# Patient Record
Sex: MALE | Race: WHITE | Employment: OTHER | URBAN - METROPOLITAN AREA
[De-identification: names, ages, dates, MRNs, and addresses within clinical notes are randomized per-mention and may not be internally consistent; named-entity substitution may affect disease eponyms.]

---

## 2023-03-09 ENCOUNTER — HOSPITAL ENCOUNTER (INPATIENT)
Age: 73
LOS: 1 days | Discharge: HOME | End: 2023-03-10
Attending: EMERGENCY MEDICINE | Admitting: STUDENT IN AN ORGANIZED HEALTH CARE EDUCATION/TRAINING PROGRAM
Payer: MEDICARE

## 2023-03-09 DIAGNOSIS — N30.00 ACUTE CYSTITIS WITHOUT HEMATURIA: ICD-10-CM

## 2023-03-09 DIAGNOSIS — E11.9 DIABETES MELLITUS, NEW ONSET (HCC): Primary | ICD-10-CM

## 2023-03-09 PROCEDURE — 96375 TX/PRO/DX INJ NEW DRUG ADDON: CPT

## 2023-03-09 PROCEDURE — 96365 THER/PROPH/DIAG IV INF INIT: CPT

## 2023-03-09 PROCEDURE — 99285 EMERGENCY DEPT VISIT HI MDM: CPT

## 2023-03-09 ASSESSMENT — PAIN SCALES - GENERAL: PAINLEVEL_OUTOF10: 5

## 2023-03-09 ASSESSMENT — PAIN DESCRIPTION - DESCRIPTORS: DESCRIPTORS: SHARP

## 2023-03-09 ASSESSMENT — PAIN DESCRIPTION - LOCATION: LOCATION: GROIN

## 2023-03-09 ASSESSMENT — PAIN DESCRIPTION - ORIENTATION: ORIENTATION: RIGHT;LEFT

## 2023-03-09 ASSESSMENT — PAIN - FUNCTIONAL ASSESSMENT: PAIN_FUNCTIONAL_ASSESSMENT: 0-10

## 2023-03-09 ASSESSMENT — PAIN DESCRIPTION - PAIN TYPE: TYPE: ACUTE PAIN

## 2023-03-10 VITALS
DIASTOLIC BLOOD PRESSURE: 71 MMHG | WEIGHT: 255 LBS | SYSTOLIC BLOOD PRESSURE: 128 MMHG | OXYGEN SATURATION: 97 % | HEART RATE: 75 BPM | HEIGHT: 72 IN | RESPIRATION RATE: 16 BRPM | BODY MASS INDEX: 34.54 KG/M2 | TEMPERATURE: 98 F

## 2023-03-10 PROBLEM — E66.811 CLASS 1 OBESITY DUE TO EXCESS CALORIES WITHOUT SERIOUS COMORBIDITY WITH BODY MASS INDEX (BMI) OF 34.0 TO 34.9 IN ADULT: Status: ACTIVE | Noted: 2023-03-10

## 2023-03-10 PROBLEM — E11.9 NEWLY DIAGNOSED DIABETES (HCC): Status: ACTIVE | Noted: 2023-03-10

## 2023-03-10 PROBLEM — E66.09 CLASS 1 OBESITY DUE TO EXCESS CALORIES WITHOUT SERIOUS COMORBIDITY WITH BODY MASS INDEX (BMI) OF 34.0 TO 34.9 IN ADULT: Status: ACTIVE | Noted: 2023-03-10

## 2023-03-10 LAB
ABSOLUTE EOS #: <0.03 K/UL (ref 0–0.44)
ABSOLUTE IMMATURE GRANULOCYTE: 0.04 K/UL (ref 0–0.3)
ABSOLUTE LYMPH #: 1.03 K/UL (ref 1.1–3.7)
ABSOLUTE MONO #: 0.85 K/UL (ref 0.1–1.2)
ALBUMIN SERPL-MCNC: 4.8 G/DL (ref 3.5–5.2)
ALBUMIN/GLOBULIN RATIO: 1.9 (ref 1–2.5)
ALP SERPL-CCNC: 129 U/L (ref 40–129)
ALT SERPL-CCNC: 14 U/L (ref 5–41)
ANION GAP SERPL CALCULATED.3IONS-SCNC: 15 MMOL/L (ref 9–17)
ANION GAP SERPL CALCULATED.3IONS-SCNC: 17 MMOL/L (ref 9–17)
AST SERPL-CCNC: 18 U/L
BACTERIA: ABNORMAL
BASOPHILS # BLD: 0 % (ref 0–2)
BASOPHILS ABSOLUTE: 0.05 K/UL (ref 0–0.2)
BETA-HYDROXYBUTYRATE: 2.3 MMOL/L (ref 0.02–0.27)
BILIRUB SERPL-MCNC: 1.4 MG/DL (ref 0.3–1.2)
BILIRUBIN URINE: NEGATIVE
BUN SERPL-MCNC: 28 MG/DL (ref 8–23)
BUN SERPL-MCNC: 29 MG/DL (ref 8–23)
CALCIUM SERPL-MCNC: 9.4 MG/DL (ref 8.6–10.4)
CALCIUM SERPL-MCNC: 9.8 MG/DL (ref 8.6–10.4)
CASTS UA: ABNORMAL /LPF (ref 0–8)
CHLORIDE SERPL-SCNC: 102 MMOL/L (ref 98–107)
CHLORIDE SERPL-SCNC: 99 MMOL/L (ref 98–107)
CO2 SERPL-SCNC: 18 MMOL/L (ref 20–31)
CO2 SERPL-SCNC: 19 MMOL/L (ref 20–31)
COLOR: YELLOW
CREAT SERPL-MCNC: 0.99 MG/DL (ref 0.7–1.2)
CREAT SERPL-MCNC: 1.11 MG/DL (ref 0.7–1.2)
EOSINOPHILS RELATIVE PERCENT: 0 % (ref 1–4)
EPITHELIAL CELLS UA: ABNORMAL /HPF (ref 0–5)
GFR SERPL CREATININE-BSD FRML MDRD: >60 ML/MIN/1.73M2
GFR SERPL CREATININE-BSD FRML MDRD: >60 ML/MIN/1.73M2
GLUCOSE BLD-MCNC: 178 MG/DL (ref 75–110)
GLUCOSE BLD-MCNC: 342 MG/DL (ref 75–110)
GLUCOSE BLD-MCNC: 411 MG/DL (ref 75–110)
GLUCOSE BLD-MCNC: 412 MG/DL (ref 75–110)
GLUCOSE SERPL-MCNC: 375 MG/DL (ref 70–99)
GLUCOSE SERPL-MCNC: 423 MG/DL (ref 70–99)
GLUCOSE UR STRIP.AUTO-MCNC: ABNORMAL MG/DL
HCT VFR BLD AUTO: 44.8 % (ref 40.7–50.3)
HGB BLD-MCNC: 16.4 G/DL (ref 13–17)
IMMATURE GRANULOCYTES: 0 %
KETONES UR STRIP.AUTO-MCNC: ABNORMAL MG/DL
LEUKOCYTE ESTERASE UR QL STRIP.AUTO: ABNORMAL
LYMPHOCYTES # BLD: 8 % (ref 24–43)
MCH RBC QN AUTO: 31.1 PG (ref 25.2–33.5)
MCHC RBC AUTO-ENTMCNC: 36.6 G/DL (ref 28.4–34.8)
MCV RBC AUTO: 85 FL (ref 82.6–102.9)
MICROORGANISM SPEC CULT: NORMAL
MONOCYTES # BLD: 6 % (ref 3–12)
NITRITE UR QL STRIP.AUTO: NEGATIVE
NRBC AUTOMATED: 0 PER 100 WBC
PDW BLD-RTO: 12.9 % (ref 11.8–14.4)
PLATELET # BLD AUTO: 202 K/UL (ref 138–453)
PMV BLD AUTO: 9.6 FL (ref 8.1–13.5)
POTASSIUM SERPL-SCNC: 3.9 MMOL/L (ref 3.7–5.3)
POTASSIUM SERPL-SCNC: 4 MMOL/L (ref 3.7–5.3)
PROT SERPL-MCNC: 7.3 G/DL (ref 6.4–8.3)
PROT UR STRIP.AUTO-MCNC: 8.5 MG/DL (ref 5–8)
PROT UR STRIP.AUTO-MCNC: ABNORMAL MG/DL
RBC # BLD: 5.27 M/UL (ref 4.21–5.77)
RBC CLUMPS #/AREA URNS AUTO: ABNORMAL /HPF (ref 0–4)
SEG NEUTROPHILS: 86 % (ref 36–65)
SEGMENTED NEUTROPHILS ABSOLUTE COUNT: 11.45 K/UL (ref 1.5–8.1)
SODIUM SERPL-SCNC: 135 MMOL/L (ref 135–144)
SODIUM SERPL-SCNC: 135 MMOL/L (ref 135–144)
SPECIFIC GRAVITY UA: 1.03 (ref 1–1.03)
SPECIMEN DESCRIPTION: NORMAL
TURBIDITY: ABNORMAL
URINE HGB: ABNORMAL
UROBILINOGEN, URINE: NORMAL
WBC # BLD AUTO: 13.4 K/UL (ref 3.5–11.3)
WBC UA: ABNORMAL /HPF (ref 0–5)

## 2023-03-10 PROCEDURE — 2580000003 HC RX 258: Performed by: NURSE PRACTITIONER

## 2023-03-10 PROCEDURE — 80048 BASIC METABOLIC PNL TOTAL CA: CPT

## 2023-03-10 PROCEDURE — 80053 COMPREHEN METABOLIC PANEL: CPT

## 2023-03-10 PROCEDURE — 6370000000 HC RX 637 (ALT 250 FOR IP): Performed by: NURSE PRACTITIONER

## 2023-03-10 PROCEDURE — 87086 URINE CULTURE/COLONY COUNT: CPT

## 2023-03-10 PROCEDURE — 2580000003 HC RX 258

## 2023-03-10 PROCEDURE — 83036 HEMOGLOBIN GLYCOSYLATED A1C: CPT

## 2023-03-10 PROCEDURE — 1200000000 HC SEMI PRIVATE

## 2023-03-10 PROCEDURE — 6370000000 HC RX 637 (ALT 250 FOR IP): Performed by: STUDENT IN AN ORGANIZED HEALTH CARE EDUCATION/TRAINING PROGRAM

## 2023-03-10 PROCEDURE — 6360000002 HC RX W HCPCS: Performed by: NURSE PRACTITIONER

## 2023-03-10 PROCEDURE — 81001 URINALYSIS AUTO W/SCOPE: CPT

## 2023-03-10 PROCEDURE — 6360000002 HC RX W HCPCS

## 2023-03-10 PROCEDURE — 82947 ASSAY GLUCOSE BLOOD QUANT: CPT

## 2023-03-10 PROCEDURE — 36415 COLL VENOUS BLD VENIPUNCTURE: CPT

## 2023-03-10 PROCEDURE — 99222 1ST HOSP IP/OBS MODERATE 55: CPT | Performed by: STUDENT IN AN ORGANIZED HEALTH CARE EDUCATION/TRAINING PROGRAM

## 2023-03-10 PROCEDURE — 82010 KETONE BODYS QUAN: CPT

## 2023-03-10 PROCEDURE — 85025 COMPLETE CBC W/AUTO DIFF WBC: CPT

## 2023-03-10 RX ORDER — BLOOD-GLUCOSE METER
1 KIT MISCELLANEOUS DAILY
Qty: 1 KIT | Refills: 0 | Status: SHIPPED | OUTPATIENT
Start: 2023-03-10

## 2023-03-10 RX ORDER — INSULIN GLARGINE 100 [IU]/ML
15 INJECTION, SOLUTION SUBCUTANEOUS ONCE
Status: COMPLETED | OUTPATIENT
Start: 2023-03-10 | End: 2023-03-10

## 2023-03-10 RX ORDER — LANCETS 30 GAUGE
1 EACH MISCELLANEOUS DAILY
Qty: 100 EACH | Refills: 3 | Status: SHIPPED | OUTPATIENT
Start: 2023-03-10

## 2023-03-10 RX ORDER — ACETAMINOPHEN 650 MG/1
650 SUPPOSITORY RECTAL EVERY 6 HOURS PRN
Status: DISCONTINUED | OUTPATIENT
Start: 2023-03-10 | End: 2023-03-10 | Stop reason: HOSPADM

## 2023-03-10 RX ORDER — ACETAMINOPHEN 325 MG/1
650 TABLET ORAL EVERY 6 HOURS PRN
Status: DISCONTINUED | OUTPATIENT
Start: 2023-03-10 | End: 2023-03-10 | Stop reason: HOSPADM

## 2023-03-10 RX ORDER — GLIMEPIRIDE 2 MG/1
2 TABLET ORAL
Qty: 30 TABLET | Refills: 0 | Status: SHIPPED | OUTPATIENT
Start: 2023-03-10 | End: 2023-04-09

## 2023-03-10 RX ORDER — GLUCOSAMINE HCL/CHONDROITIN SU 500-400 MG
CAPSULE ORAL
Qty: 100 STRIP | Refills: 0 | Status: SHIPPED | OUTPATIENT
Start: 2023-03-10

## 2023-03-10 RX ORDER — POLYETHYLENE GLYCOL 3350 17 G/17G
17 POWDER, FOR SOLUTION ORAL DAILY PRN
Status: DISCONTINUED | OUTPATIENT
Start: 2023-03-10 | End: 2023-03-10 | Stop reason: HOSPADM

## 2023-03-10 RX ORDER — ENOXAPARIN SODIUM 100 MG/ML
30 INJECTION SUBCUTANEOUS 2 TIMES DAILY
Status: DISCONTINUED | OUTPATIENT
Start: 2023-03-10 | End: 2023-03-10 | Stop reason: HOSPADM

## 2023-03-10 RX ORDER — DEXTROSE MONOHYDRATE 100 MG/ML
INJECTION, SOLUTION INTRAVENOUS CONTINUOUS PRN
Status: DISCONTINUED | OUTPATIENT
Start: 2023-03-10 | End: 2023-03-10 | Stop reason: HOSPADM

## 2023-03-10 RX ORDER — KETOROLAC TROMETHAMINE 15 MG/ML
15 INJECTION, SOLUTION INTRAMUSCULAR; INTRAVENOUS ONCE
Status: COMPLETED | OUTPATIENT
Start: 2023-03-10 | End: 2023-03-10

## 2023-03-10 RX ORDER — CEFTRIAXONE 1 G/1
1000 INJECTION, POWDER, FOR SOLUTION INTRAMUSCULAR; INTRAVENOUS ONCE
Status: DISCONTINUED | OUTPATIENT
Start: 2023-03-10 | End: 2023-03-10

## 2023-03-10 RX ORDER — 0.9 % SODIUM CHLORIDE 0.9 %
1000 INTRAVENOUS SOLUTION INTRAVENOUS ONCE
Status: COMPLETED | OUTPATIENT
Start: 2023-03-10 | End: 2023-03-10

## 2023-03-10 RX ORDER — SODIUM CHLORIDE 0.9 % (FLUSH) 0.9 %
5-40 SYRINGE (ML) INJECTION PRN
Status: DISCONTINUED | OUTPATIENT
Start: 2023-03-10 | End: 2023-03-10 | Stop reason: HOSPADM

## 2023-03-10 RX ORDER — INSULIN LISPRO 100 [IU]/ML
0-16 INJECTION, SOLUTION INTRAVENOUS; SUBCUTANEOUS
Status: DISCONTINUED | OUTPATIENT
Start: 2023-03-10 | End: 2023-03-10 | Stop reason: HOSPADM

## 2023-03-10 RX ORDER — INSULIN LISPRO 100 [IU]/ML
0-4 INJECTION, SOLUTION INTRAVENOUS; SUBCUTANEOUS NIGHTLY
Status: DISCONTINUED | OUTPATIENT
Start: 2023-03-10 | End: 2023-03-10

## 2023-03-10 RX ORDER — INSULIN LISPRO 100 [IU]/ML
0-4 INJECTION, SOLUTION INTRAVENOUS; SUBCUTANEOUS
Status: DISCONTINUED | OUTPATIENT
Start: 2023-03-10 | End: 2023-03-10

## 2023-03-10 RX ORDER — INSULIN LISPRO 100 [IU]/ML
0-4 INJECTION, SOLUTION INTRAVENOUS; SUBCUTANEOUS NIGHTLY
Status: DISCONTINUED | OUTPATIENT
Start: 2023-03-10 | End: 2023-03-10 | Stop reason: HOSPADM

## 2023-03-10 RX ORDER — SODIUM CHLORIDE 0.9 % (FLUSH) 0.9 %
5-40 SYRINGE (ML) INJECTION EVERY 12 HOURS SCHEDULED
Status: DISCONTINUED | OUTPATIENT
Start: 2023-03-10 | End: 2023-03-10 | Stop reason: HOSPADM

## 2023-03-10 RX ORDER — SODIUM CHLORIDE 9 MG/ML
INJECTION, SOLUTION INTRAVENOUS PRN
Status: DISCONTINUED | OUTPATIENT
Start: 2023-03-10 | End: 2023-03-10 | Stop reason: HOSPADM

## 2023-03-10 RX ORDER — INSULIN LISPRO 100 [IU]/ML
5 INJECTION, SOLUTION INTRAVENOUS; SUBCUTANEOUS ONCE
Status: COMPLETED | OUTPATIENT
Start: 2023-03-10 | End: 2023-03-10

## 2023-03-10 RX ORDER — CEFDINIR 300 MG/1
300 CAPSULE ORAL 2 TIMES DAILY
Qty: 14 CAPSULE | Refills: 0 | Status: SHIPPED | OUTPATIENT
Start: 2023-03-10 | End: 2023-03-17

## 2023-03-10 RX ORDER — ONDANSETRON 2 MG/ML
4 INJECTION INTRAMUSCULAR; INTRAVENOUS EVERY 6 HOURS PRN
Status: DISCONTINUED | OUTPATIENT
Start: 2023-03-10 | End: 2023-03-10 | Stop reason: HOSPADM

## 2023-03-10 RX ORDER — SODIUM CHLORIDE 9 MG/ML
INJECTION, SOLUTION INTRAVENOUS CONTINUOUS
Status: DISCONTINUED | OUTPATIENT
Start: 2023-03-10 | End: 2023-03-10

## 2023-03-10 RX ORDER — ONDANSETRON 4 MG/1
4 TABLET, ORALLY DISINTEGRATING ORAL EVERY 8 HOURS PRN
Status: DISCONTINUED | OUTPATIENT
Start: 2023-03-10 | End: 2023-03-10 | Stop reason: HOSPADM

## 2023-03-10 RX ADMIN — INSULIN LISPRO 4 UNITS: 100 INJECTION, SOLUTION INTRAVENOUS; SUBCUTANEOUS at 08:46

## 2023-03-10 RX ADMIN — INSULIN LISPRO 5 UNITS: 100 INJECTION, SOLUTION INTRAVENOUS; SUBCUTANEOUS at 04:16

## 2023-03-10 RX ADMIN — INSULIN LISPRO 12 UNITS: 100 INJECTION, SOLUTION INTRAVENOUS; SUBCUTANEOUS at 11:26

## 2023-03-10 RX ADMIN — SODIUM CHLORIDE, PRESERVATIVE FREE 10 ML: 5 INJECTION INTRAVENOUS at 11:29

## 2023-03-10 RX ADMIN — SODIUM CHLORIDE 1000 ML: 9 INJECTION, SOLUTION INTRAVENOUS at 00:13

## 2023-03-10 RX ADMIN — ENOXAPARIN SODIUM 30 MG: 100 INJECTION SUBCUTANEOUS at 04:48

## 2023-03-10 RX ADMIN — SODIUM CHLORIDE: 9 INJECTION, SOLUTION INTRAVENOUS at 03:39

## 2023-03-10 RX ADMIN — CEFTRIAXONE SODIUM 1000 MG: 1 INJECTION, POWDER, FOR SOLUTION INTRAMUSCULAR; INTRAVENOUS at 01:23

## 2023-03-10 RX ADMIN — KETOROLAC TROMETHAMINE 15 MG: 15 INJECTION, SOLUTION INTRAMUSCULAR; INTRAVENOUS at 00:12

## 2023-03-10 RX ADMIN — INSULIN GLARGINE 15 UNITS: 100 INJECTION, SOLUTION SUBCUTANEOUS at 11:26

## 2023-03-10 ASSESSMENT — PAIN SCALES - GENERAL
PAINLEVEL_OUTOF10: 3
PAINLEVEL_OUTOF10: 5

## 2023-03-10 ASSESSMENT — PAIN DESCRIPTION - ORIENTATION
ORIENTATION: RIGHT;LEFT
ORIENTATION: RIGHT;LEFT

## 2023-03-10 ASSESSMENT — ENCOUNTER SYMPTOMS
SORE THROAT: 0
DIARRHEA: 0
ABDOMINAL DISTENTION: 0
NAUSEA: 0
VOMITING: 0
ABDOMINAL PAIN: 1
COLOR CHANGE: 0
ABDOMINAL PAIN: 0
EYE REDNESS: 0
SHORTNESS OF BREATH: 0
STRIDOR: 0
APNEA: 0
COUGH: 0
EYE ITCHING: 0

## 2023-03-10 ASSESSMENT — PAIN - FUNCTIONAL ASSESSMENT: PAIN_FUNCTIONAL_ASSESSMENT: 0-10

## 2023-03-10 ASSESSMENT — PAIN DESCRIPTION - LOCATION
LOCATION: GROIN
LOCATION: GROIN

## 2023-03-10 ASSESSMENT — PAIN DESCRIPTION - DESCRIPTORS: DESCRIPTORS: SHARP

## 2023-03-10 NOTE — PROGRESS NOTES
CLINICAL PHARMACY NOTE: MEDS TO BEDS    Total # of Prescriptions Filled: 6   The following medications were delivered to the patient:  Glucometer  Test strips  Lancets  Metformin 1000mg  Glimepiride 2mg  Cefdinir 300mg    Additional Documentation: delivered to patient in room 328 3/10 at 11:13am. Co-pay $44.08 [employer].

## 2023-03-10 NOTE — H&P
[facility]  Office: [address], DO, Colette Guardado, DO, Jerry Taylor, DO, Faina Betancourt Blood, DO, Rob Christianson MD, Rachana Gay MD, Jean Weldon MD, Kobe Márquez MD,  Azul Pablo MD, Criss Huff MD, Joyce Faria, DO, Kyle Duffy MD,  Sadaf Pacheco MD, Fernando Stewart MD, Coye Meigs, DO, Tucker Acosta MD, Aurelio Worthington MD, Karishma Castaneda DO, Clearance MD Leatha, Delfin Dey MD, Yanet Zhu MD, Byron Castrejon MD, Patty Alaniz MD, James Daniels DO, Maycol Castaneda MD, Vinh Pérez MD, Shahla Riddle, CNP,  Fariha Avalos, CNP, Laura Rojo, CNP, Hiram Franks, CNP,  Tamia Bangura, DNP, Arabella Ryan, CNP, Merle Louise, CNP, Devin Rutherford, CNP, Reynold Becker, CNP, Keshav Stallworth, CNP, Jossy Valdes PA-C, Shagufta Phillips, CNS, Sherley Loomis, CNP, Ladan Burdick, CNP         [address]    HISTORY AND PHYSICAL EXAMINATION            Date:   3/10/2023  Patient name:  Anna Contreras  Date of admission:  3/9/2023 11:36 PM  MRN:   [id number]  Account:  [de-identified]  YOB: 1950  PCP:    No primary care provider on file. Room:   [address] [place] [address]  Code Status:    Full Code    Chief Complaint:     Chief Complaint   Patient presents with    Groin Pain       History Obtained From:     patient, spouse    History of Present Illness:     Anna Contreras is a 71 y.o. Non- / non  male who presents with Groin Pain   and is admitted to the hospital for the management of Newly diagnosed diabetes ([facility] [address] 71year old non compliant male with obesity presents with urinary pain. Has not seen a PCP in many years found to be newly diabetic. Has had history of kidney stones in the past 40 years. Past Medical History:     History reviewed. No pertinent past medical history. Past Surgical History:     History reviewed. No pertinent surgical history.      Medications Prior to Admission:     Prior to Admission medications    Not on File        Allergies:     Patient has no known allergies. Social History:     Tobacco:    has no history on file for tobacco use. Alcohol:      reports current alcohol use. Drug Use:  has no history on file for drug use. Family History:     History reviewed. No pertinent family history. Review of Systems:     Positive and Negative as described in HPI. Review of Systems   Constitutional:  Negative for activity change, fatigue and unexpected weight change. HENT:  Negative for congestion and mouth sores. Eyes:  Negative for redness and itching. Respiratory:  Negative for apnea and shortness of breath. Cardiovascular:  Negative for chest pain and palpitations. Gastrointestinal:  Negative for abdominal distention and abdominal pain. Endocrine: Positive for polydipsia and polyuria. Genitourinary:  Positive for dysuria. Negative for difficulty urinating. Musculoskeletal:  Negative for arthralgias and myalgias. Skin:  Negative for color change and wound. Neurological:  Negative for dizziness and seizures. Psychiatric/Behavioral:  Negative for agitation and dysphoric mood. Physical Exam:   BP (!) 163/70   Pulse 88   Temp 98.4 °F (36.9 °C) (Oral)   Resp 17   Ht 6' (1.829 m)   Wt 255 lb (115.7 kg)   SpO2 98%   BMI 34.58 kg/m²   Temp (24hrs), Av.6 °F (37 °C), Min:98.4 °F (36.9 °C), Max:98.8 °F (37.1 °C)    Recent Labs     03/10/23  0351 03/10/23  0749   POCGLU 412* 411*     No intake or output data in the 24 hours ending 03/10/23 0911    Physical Exam  Constitutional:       Appearance: He is obese. He is not ill-appearing or diaphoretic. HENT:      Head: Normocephalic. Right Ear: External ear normal.      Left Ear: External ear normal.      Nose: Nose normal.      Mouth/Throat:      Mouth: Mucous membranes are moist.   Eyes:      Pupils: Pupils are equal, round, and reactive to light.    Cardiovascular:      Rate and Rhythm: Normal rate and regular rhythm. Heart sounds: No murmur heard. Pulmonary:      Effort: No respiratory distress. Breath sounds: No rhonchi. Comments: Decreased bialterally in bases  Abdominal:      General: There is no distension. Palpations: Abdomen is soft. Tenderness: There is no guarding. Musculoskeletal:      Cervical back: Neck supple. Right lower leg: No edema. Left lower leg: No edema. Skin:     General: Skin is dry. Capillary Refill: Capillary refill takes less than 2 seconds. Coloration: Skin is pale. Neurological:      Mental Status: He is alert and oriented to person, place, and time.    Psychiatric:         Mood and Affect: Mood normal.         Behavior: Behavior normal.       Investigations:      Laboratory Testing:  Recent Results (from the past 24 hour(s))   CBC with Auto Differential    Collection Time: 03/10/23 12:08 AM   Result Value Ref Range    WBC 13.4 (H) 3.5 - 11.3 k/uL    RBC 5.27 4.21 - 5.77 m/uL    Hemoglobin 16.4 13.0 - 17.0 g/dL    Hematocrit 44.8 40.7 - 50.3 %    MCV 85.0 82.6 - 102.9 fL    MCH 31.1 25.2 - 33.5 pg    MCHC 36.6 (H) 28.4 - 34.8 g/dL    RDW 12.9 11.8 - 14.4 %    Platelets 692 565 - 348 k/uL    MPV 9.6 8.1 - 13.5 fL    NRBC Automated 0.0 0.0 per 100 WBC    Seg Neutrophils 86 (H) 36 - 65 %    Lymphocytes 8 (L) 24 - 43 %    Monocytes 6 3 - 12 %    Eosinophils % 0 (L) 1 - 4 %    Basophils 0 0 - 2 %    Immature Granulocytes 0 0 %    Segs Absolute 11.45 (H) 1.50 - 8.10 k/uL    Absolute Lymph # 1.03 (L) 1.10 - 3.70 k/uL    Absolute Mono # 0.85 0.10 - 1.20 k/uL    Absolute Eos # <0.03 0.00 - 0.44 k/uL    Basophils Absolute 0.05 0.00 - 0.20 k/uL    Absolute Immature Granulocyte 0.04 0.00 - 0.30 k/uL   Comprehensive Metabolic Panel    Collection Time: 03/10/23 12:09 AM   Result Value Ref Range    Glucose 375 (H) 70 - 99 mg/dL    BUN 28 (H) 8 - 23 mg/dL    Creatinine 1.11 0.70 - 1.20 mg/dL    Est, Glom Filt Rate >60 >60 mL/min/1.73m2    Calcium 9.8 8.6 - 10.4 mg/dL    Sodium 135 135 - 144 mmol/L    Potassium 3.9 3.7 - 5.3 mmol/L    Chloride 99 98 - 107 mmol/L    CO2 19 (L) 20 - 31 mmol/L    Anion Gap 17 9 - 17 mmol/L    Alkaline Phosphatase 129 40 - 129 U/L    ALT 14 5 - 41 U/L    AST 18 <40 U/L    Total Bilirubin 1.4 (H) 0.3 - 1.2 mg/dL    Total Protein 7.3 6.4 - 8.3 g/dL    Albumin 4.8 3.5 - 5.2 g/dL    Albumin/Globulin Ratio 1.9 1.0 - 2.5   Beta-Hydroxybutyrate    Collection Time: 03/10/23 12:09 AM   Result Value Ref Range    Beta-Hydroxybutyrate 2.30 (H) 0.02 - 0.27 mmol/L   Urinalysis with Microscopic    Collection Time: 03/10/23 12:15 AM   Result Value Ref Range    Color, UA Yellow Yellow    Turbidity UA Turbid (A) Clear    Glucose, Ur 3+ (A) NEGATIVE    Bilirubin Urine NEGATIVE NEGATIVE    Ketones, Urine SMALL (A) NEGATIVE    Specific Gravity, UA 1.030 1.005 - 1.030    Urine Hgb SMALL (A) NEGATIVE    pH, UA 8.5 (H) 5.0 - 8.0    Protein, UA 1+ (A) NEGATIVE    Urobilinogen, Urine Normal Normal    Nitrite, Urine NEGATIVE NEGATIVE    Leukocyte Esterase, Urine MODERATE (A) NEGATIVE    WBC, UA TOO NUMEROUS TO COUNT 0 - 5 /HPF    RBC, UA 0 TO 2 0 - 4 /HPF    Casts UA  0 - 8 /LPF     2 TO 5 HYALINE Reference range defined for non-centrifuged specimen.     Epithelial Cells UA 0 TO 2 0 - 5 /HPF    Bacteria, UA MANY (A) None   POC Glucose Fingerstick    Collection Time: 03/10/23  3:51 AM   Result Value Ref Range    POC Glucose 412 (HH) 75 - 110 mg/dL   Basic Metabolic Panel w/ Reflex to MG    Collection Time: 03/10/23  7:18 AM   Result Value Ref Range    Glucose 423 (HH) 70 - 99 mg/dL    BUN 29 (H) 8 - 23 mg/dL    Creatinine 0.99 0.70 - 1.20 mg/dL    Est, Glom Filt Rate >60 >60 mL/min/1.73m2    Calcium 9.4 8.6 - 10.4 mg/dL    Sodium 135 135 - 144 mmol/L    Potassium 4.0 3.7 - 5.3 mmol/L    Chloride 102 98 - 107 mmol/L    CO2 18 (L) 20 - 31 mmol/L    Anion Gap 15 9 - 17 mmol/L   POC Glucose Fingerstick    Collection Time: 03/10/23  7:49 AM   Result Value Ref Range    POC Glucose 411 (HH) 75 - 110 mg/dL       Imaging/Diagnostics:  No results found. Assessment :      Hospital Problems             Last Modified POA    * (Principal) Newly diagnosed diabetes ([address] 3/10/2023 Yes       Plan:     Patient status observation in the Med/Surge    Discussed with patient and wife. No interested in staying for a prolonged duration. [facility] with oral hypoglycemics, not interested in ozempic, ok with amaryl and metformin. UTI cefdinir  Encouraged to have PCP follow up. Not interested at this time  Wanting to go home today, not willing to stay for further work up.     Consultations:   IP CONSULT TO HOSPITALIST  IP CONSULT TO SOCIAL WORK      Tiffany Marshall MD  3/10/2023  9:11 AM

## 2023-03-10 NOTE — ED NOTES
Pt presents to ER with c/o of groin pain without traumatic injury. Pt states he had the same symptom back in 40 years ago and was diagnosed with kidney stones. Pt denies dysuria nor hematuria. Pt is Ox4&A, NAD noted. Pt denies chest pain, CVAT, SOB, N/V/D. Continue with plan of care.      THIAGO Pascual  03/09/23 1980

## 2023-03-10 NOTE — ED PROVIDER NOTES
Faculty Sign-Out Attestation  Handoff taken on the following patient from prior Attending Physician: Rosalva Nieves    I was available and discussed any additional care issues that arose and coordinated the management plans with the resident(s) caring for the patient during my duty period. Any areas of disagreement with residents documentation of care or procedures are noted on the chart. I was personally present for the key portions of any/all procedures during my duty period. I have documented in the chart those procedures where I was not present during the key portions.     New onset dm, uti,   Inter med admit     Dacia Parkinson DO  Attending Physician       Dacia Parkinson DO  03/10/23 0133

## 2023-03-10 NOTE — ED PROVIDER NOTES
[facility]     Emergency Department     Faculty Attestation    I performed a history and physical examination of the patient and discussed management with the resident. I reviewed the residents note and agree with the documented findings and plan of care. Any areas of disagreement are noted on the chart. I was personally present for the key portions of any procedures. I have documented in the chart those procedures where I was not present during the key portions. I have reviewed the emergency nurses triage note. I agree with the chief complaint, past medical history, past surgical history, allergies, medications, social and family history as documented unless otherwise noted below. For Physician Assistant/ Nurse Practitioner cases/documentation I have personally evaluated this patient and have completed at least one if not all key elements of the E/M (history, physical exam, and MDM). Additional findings are as noted. I have personally seen and evaluated the patient. I find the patient's history and physical exam are consistent with the NP/PA documentation. I agree with the care provided, treatment rendered, disposition and follow-up plan. 69-year-old male with history of kidney stones presenting with pelvic pain. States that he has had intermittent kidney stones for the last 40 years, but typically pain does not last more than 2 hours before he passes it. He has had pain since 7:00 tonight, and is not improving. No back pain, chest pain, or shortness of breath. No syncope. Denies any blood in the urine. When straining, urine stream is difficult to pass. Denies testicular pain. Pain is constant, not colicky.     Exam:  General : Laying on the bed, awake, alert, and in no acute distress  CV : normal rate and regular rhythm  Lungs : Breathing comfortably on room air with no tachypnea, hypoxia, or increased work of breathing  Abdomen: Suprapubic abdominal pain    DDx: Ureterolithiasis, UTI/pyelonephritis. No flank pain or syncopal events to suggest AAA. No history of malignancy. Doubt torsion or testicular mass with no scrotal pain. No fever or chills to suggest systemic infection. Plan:  UA, CBC, electrolytes with renal function  If UA is positive, plan to treat for infection. If UA is negative for infection will obtain CT imaging to evaluate for obstructive ureterolithiasis or other sources of suprapubic abdominal pain on CT such as mass, vascular lesion, etc.  Pain control, IV fluids    Medical Decision Making  Amount and/or Complexity of Data Reviewed  Labs: ordered. Risk  Prescription drug management.         Sisto Duane, MD   Attending Emergency Physician    (Please note that portions of this note were completed with a voice recognition program. Efforts were made to edit the dictations but occasionally words are mis-transcribed.)            Sisto Duane, MD  03/10/23 0015

## 2023-03-10 NOTE — ED PROVIDER NOTES
[facility]  ED  Emergency Department Encounter  Emergency Medicine Resident     Pt Lázaro Wilcox  MRN: [id number]  Steffanytrongfqasim 1950  Date of evaluation: 3/9/23  PCP:  No primary care provider on file. Note Started: 11:38 PM EST      CHIEF COMPLAINT       Chief Complaint   Patient presents with    Groin Pain       HISTORY OF PRESENT ILLNESS  (Location/Symptom, Timing/Onset, Context/Setting, Quality, Duration, Modifying Factors, Severity.)      Vipin Anderson is a 71 y.o. male who presents with penile pain. He states the pain began around 7 PM acutely. He locates his pain in the suprapubic region and describes it as a constant and sharp pain. He rates it as a 5/10. The pain does not radiate and he states it worsens after he voids. The patient also states that  he does not feel as though he has completely voided. His wife states that he has a history of kidney stones and when he urinated into a filter she noted black silt. He worked as a restorer    PAST MEDICAL / SURGICAL / SOCIAL / FAMILY HISTORY      has no past medical history on file. Kidney stones     has no past surgical history on file.       Social History     Socioeconomic History    Marital status:      Spouse name: Not on file    Number of children: Not on file    Years of education: Not on file    Highest education level: Not on file   Occupational History    Not on file   Tobacco Use    Smoking status: Not on file    Smokeless tobacco: Not on file   Substance and Sexual Activity    Alcohol use: Yes     Comment: occasionally    Drug use: Not on file    Sexual activity: Not on file   Other Topics Concern    Not on file   Social History Narrative    Not on file     Social Determinants of Health     Financial Resource Strain: Not on file   Food Insecurity: Not on file   Transportation Needs: Not on file   Physical Activity: Not on file   Stress: Not on file   Social Connections: Not on file   Intimate Partner Violence: Not on file   Housing Stability: Not on file       History reviewed. No pertinent family history. Allergies:  Patient has no known allergies. Home Medications:  Prior to Admission medications    Not on File         REVIEW OF SYSTEMS       Review of Systems   Constitutional:  Negative for activity change, appetite change, chills, diaphoresis and fever. HENT:  Negative for sore throat. Respiratory:  Negative for cough, shortness of breath and stridor. Cardiovascular:  Negative for chest pain and palpitations. Gastrointestinal:  Positive for abdominal pain (suprapubic). Negative for diarrhea, nausea and vomiting. Endocrine: Positive for polyuria. Genitourinary:  Positive for difficulty urinating. Negative for flank pain, penile discharge, penile pain, scrotal swelling and testicular pain. Skin:  Negative for wound. Allergic/Immunologic: Negative for environmental allergies and food allergies. Neurological:  Negative for weakness. Psychiatric/Behavioral:  Negative for self-injury and suicidal ideas. PHYSICAL EXAM      INITIAL VITALS:   BP (!) 155/68   Pulse 98   Temp 98.8 °F (37.1 °C) (Oral)   Resp 19   Ht 6' (1.829 m)   Wt 255 lb (115.7 kg)   SpO2 94%   BMI 34.58 kg/m²     Physical Exam  Exam conducted with a chaperone present ([address] [place] [address] RN). Constitutional:       Appearance: Normal appearance. HENT:      Head: Normocephalic and atraumatic. Mouth/Throat:      Mouth: Mucous membranes are moist.      Pharynx: No oropharyngeal exudate or posterior oropharyngeal erythema. Eyes:      Extraocular Movements: Extraocular movements intact. Pupils: Pupils are equal, round, and reactive to light. Cardiovascular:      Rate and Rhythm: Normal rate. Pulses: Normal pulses. Heart sounds: Normal heart sounds. No murmur heard. Pulmonary:      Effort: Pulmonary effort is normal. No respiratory distress. Breath sounds: Normal breath sounds.    Abdominal:      General: Abdomen is flat. Bowel sounds are normal. There is no distension. There are no signs of injury. Palpations: Abdomen is soft. Tenderness: There is abdominal tenderness in the suprapubic area. There is no right CVA tenderness or left CVA tenderness. Hernia: No hernia is present. There is no hernia in the left inguinal area or right inguinal area. Genitourinary:     Pubic Area: No rash or pubic lice. Penis: Uncircumcised. Testes: Normal. Cremasteric reflex is present. Right: Mass, tenderness or swelling not present. Left: Mass, tenderness or swelling not present. Oscar stage (genital): 5. Comments: The penis appears retracted, likely secondary to habitus. .    Dribbling noted on  exam  Lymphadenopathy:      Lower Body: No right inguinal adenopathy. No left inguinal adenopathy. Neurological:      Mental Status: He is alert. DDX/DIAGNOSTIC RESULTS / EMERGENCY DEPARTMENT COURSE / MDM     Medical Decision Making  This 78-year-old male who presents with cute onset pelvic pain. He believes that this is likely a kidney stone, however physical exam and history provided differential includes UTI, pyelonephritis, nephrolithiasis, benign prostatic hypertrophy, renal cell carcinoma. Genitourinary sick exam is unremarkable. Physical examination reveals suprapubic tenderness to palpation. Currently awaiting urinalysis, CBC, and electrolyte panel. Should the urinalysis shows signs of UTI/kidney stones, patient will be treated appropriately, however if further work-up is required we will consider CT abdomen pelvis. Further MDM will be noted in the ED course. Amount and/or Complexity of Data Reviewed  Independent Historian: spouse     Details: Wife states that patient rarely visits the doctor  External Data Reviewed: notes. Details: This patient has no documentation noted on chart review or Care Everywhere. Labs: ordered.  Decision-making details documented in ED Course. Radiology:  Decision-making details documented in ED Course. ECG/medicine tests:  Decision-making details documented in ED Course. Risk  Prescription drug management. Decision regarding hospitalization. EKG  none    All EKG's are interpreted by the Emergency Department Physician who either signs or Co-signs this chart in the absence of a cardiologist.    EMERGENCY DEPARTMENT COURSE:      ED Course as of 03/10/23 0257   Fri Mar 10, 2023   0038 WBC(!): 13.4  Patient has elevated WBC but does not meet sirs criteria   [MZ]   0059 Ketones, Urine(!): SMALL  Famiy history in son and sister. Given random glucose > 200, can diagnose patient with DM, may consider admission for UTI treatment and diabetes mnagement [MZ]   0103 Glucose, Random(!): 375 [MZ]   0112 Discussed admission with patient for new onset diabetic management and treatment of UTI, both patient and wife are in agreement. Wife and  also asking that NO OZEMPIC be given [MZ]   0122 Will page intermed when BHB is back   [MZ]   0217 Beta-Hydroxybutyrate(!): 2.30 [MZ]      ED Course User Index  [MZ] Hyacinth Garcia MD       PROCEDURES:  none    CONSULTS:  IP CONSULT TO HOSPITALIST    CRITICAL CARE:  There was significant risk of life threatening deterioration of patient's condition requiring my direct management. Critical care time 0 minutes, excluding any documented procedures. FINAL IMPRESSION      1. Diabetes mellitus, new onset ([facility] [address]    2. Acute cystitis without hematuria          DISPOSITION / PLAN     DISPOSITION Admitted 03/10/2023 02:24:38 AM      PATIENT REFERRED TO:  No follow-up provider specified.     DISCHARGE MEDICATIONS:  New Prescriptions    No medications on file       Hyacinth Garcia MD  Emergency Medicine Resident    (Please note that portions of thisnote were completed with a voice recognition program.  Efforts were made to edit the dictations but occasionally words are mis-transcribed.)        Hyacinth Garcia MD  Resident  03/10/23 8123

## 2023-03-10 NOTE — CARE COORDINATION
Case Management Assessment  Initial Evaluation    Date/Time of Evaluation: 3/10/2023 3:57 PM  Assessment Completed by: Danni Alvarado RN    If patient is discharged prior to next notation, then this note serves as note for discharge by case management. Patient Name: Fouzia Mosher                   YOB: 1950  Diagnosis: Newly diagnosed diabetes ([facility] [E11.9]  Diabetes mellitus, new onset ([facility] [E11.9]  Acute cystitis without hematuria [N30.00]                   Date / Time: 3/9/2023 11:36 PM    Patient Admission Status: Inpatient   Readmission Risk (Low < 19, Mod (19-27), High > 27): Readmission Risk Score: 6.9    Current PCP: No primary care provider on file. PCP verified by CM? (No PCP)    Chart Reviewed: Yes      History Provided by: Patient  Patient Orientation: Alert and Oriented, Person, Place, Situation    Patient Cognition: Alert    Hospitalization in the last 30 days (Readmission):  No    If yes, Readmission Assessment in  Navigator will be completed. Advance Directives:      Code Status: Full Code   Patient's Primary Decision Maker is: Legal Next of Kin      Discharge Planning:    Patient lives with: Spouse/Significant Other Type of Home: House  Primary Care Giver: Self  Patient Support Systems include: Spouse/Significant Other   Current Financial resources: Medicare  Current community resources:    Current services prior to admission: None            Current DME:              Type of Home Care services:  (P) None    ADLS  Prior functional level: Independent in ADLs/IADLs  Current functional level: Independent in ADLs/IADLs    PT AM-PAC:   /24  OT AM-PAC:   /24    Family can provide assistance at DC: Yes  Would you like Case Management to discuss the discharge plan with any other family members/significant others, and if so, who?  No  Plans to Return to Present Housing: Yes  Other Identified Issues/Barriers to RETURNING to current housing: na  Potential Assistance needed at discharge: N/A            Potential DME:    Patient expects to discharge to: (P) House  Plan for transportation at discharge: (P) Family    Financial    Payor: Elizabeth Gardner / Plan: Fara Torres PPO / Product Type: Medicare /     Does insurance require precert for SNF: Yes    Potential assistance Purchasing Medications: (P) No  Meds-to-Beds request: Yes    No Pharmacies Listed    Notes:    Factors facilitating achievement of predicted outcomes: Family support    Barriers to discharge: Medical complications and Medication managment    Additional Case Management Notes: Spoke with patient, role explained. Plans to return home independently has transportation. Refuses home care. Address, telephone number, and insurance reviewed. Patient does not have PCP, refuses to let CM set  up PCP appt. States that he has an [employer] list and will set one up. The Plan for Transition of Care is related to the following treatment goals of Newly diagnosed diabetes ([address] [E11.9]  Diabetes mellitus, new onset ([address] [E11.9]  Acute cystitis without hematuria [R58.47]    IF APPLICABLE: The Patient and/or patient representative Luz Banerjee and his family were provided with a choice of provider and agrees with the discharge plan. Freedom of choice list with basic dialogue that supports the patient's individualized plan of care/goals and shares the quality data associated with the providers was provided to: (P) Patient   Patient Representative Name:       The Patient and/or Patient Representative Agree with the Discharge Plan?       Cj Mckeon RN  Case Management Department  Ph: [phone]

## 2023-03-11 NOTE — DISCHARGE SUMMARY
[facility]  Office: [address] [facility], DO, Orjosesangeetha Krishnan, DO, Kalyan Thomas, DO, Gian Jimenezl Blood, DO, Sujata Weir MD, Tami Yoon MD, Sam Joe MD, Twila Soliz MD,  Maritza Gerard MD, Mary Bryant MD, Yong Bai DO, Remington Burrows MD,  Roni Yan MD, Rodrick Fernández MD, Noa Baer DO, Nathaly Tripp MD, Flori Lovelace MD, Elayne Ibrahim DO, Nathanael Padilla MD, Medhat Loza MD, Joann Hernandes MD, Taryn Hernandez MD, Katarzyna Henning MD, Elie Gonsalves DO, Jr aSmayoa MD, Romina Blanco MD, Sobia Louise, CNP,  Shweta Ahmadi, CNP, Viktor Rascon, CNP, Samy Cardona, CNP,  Lisandra Bernstein, [facility], Malachi Barone, CNP, Lalo Khan, CNP, Ángela Lora, CNP, Rock James, CNP, El Thomas, CNP, Ethan Hopkins PA-C, Batsheva Cardoza, CNS, Matheus Booker, CNP, Dinora Diego, CNP         [address]    Discharge Summary     Patient ID: Carmen Gates  :  1950   MRN: [id number]     ACCOUNT:  [de-identified]   Patient's PCP: No primary care provider on file. Admit Date: 3/9/2023   Discharge Date: 3/11/2023     Length of Stay: 1  Code Status:  Prior  Admitting Physician: Remington Burrows MD  Discharge Physician: Remington Burrows MD     Active Discharge Diagnoses:     Hospital Problem Lists:  Principal Problem:    Newly diagnosed diabetes ([facility]  Active Problems:    Class 1 obesity due to excess calories without serious comorbidity with body mass index (BMI) of 34.0 to 34.9 in adult  Resolved Problems:    * No resolved hospital problems. *      Admission Condition:  stable     Discharged Condition: stable    Hospital Stay:     Hospital Course:  Carmen Gates is a 71 y.o. male who was admitted for the management of   Newly diagnosed diabetes ([facility] [address] , presented to ER with Groin Pain      71year old non compliant male with obesity presents with urinary pain.  Has not seen a PCP in many years found to be newly diabetic. Has had history of kidney stones in the past 40 years. Patient found to be diabetic. Had a discussion with patient and family not interested in waiting for further work-up and would like to be discharged. Discharge patient on metformin and Amaryl. Metformin to start after completion of antibiotics for his UTI. Encourage patient to find a PCP prescription given for suspected sleep apnea, lipid panel for suspected hyperlipidemia and encouraged to follow-up with her PCP to discuss risks and benefits of coronary artery disease. Family was not interested in Lipitor at this time. Significant therapeutic interventions: N/A    Significant Diagnostic Studies:   Labs / Micro:  CBC:   Lab Results   Component Value Date/Time    WBC 13.4 03/10/2023 12:08 AM    RBC 5.27 03/10/2023 12:08 AM    HGB 16.4 03/10/2023 12:08 AM    HCT 44.8 03/10/2023 12:08 AM    MCV 85.0 03/10/2023 12:08 AM    MCH 31.1 03/10/2023 12:08 AM    MCHC 36.6 03/10/2023 12:08 AM    RDW 12.9 03/10/2023 12:08 AM     03/10/2023 12:08 AM     BMP:    Lab Results   Component Value Date/Time    GLUCOSE 423 03/10/2023 07:18 AM     03/10/2023 07:18 AM    K 4.0 03/10/2023 07:18 AM     03/10/2023 07:18 AM    CO2 18 03/10/2023 07:18 AM    ANIONGAP 15 03/10/2023 07:18 AM    BUN 29 03/10/2023 07:18 AM    CREATININE 0.99 03/10/2023 07:18 AM    CALCIUM 9.4 03/10/2023 07:18 AM    LABGLOM >60 03/10/2023 07:18 AM        Radiology:  No results found. Consultations:    Consults:     Final Specialist Recommendations/Findings:   IP CONSULT TO HOSPITALIST      The patient was seen and examined on day of discharge and this discharge summary is in conjunction with any daily progress note from day of discharge. Discharge plan:     Disposition: Home    Physician Follow Up: We encourage you to follow-up with a PCP    Requiring Further Evaluation/Follow Up POST HOSPITALIZATION/Incidental Findings:  Follow-up PCP, follow-up lipid panel, follow-up diabetes education, sleep study offered as outpatient. Recommended to follow-up and be evaluated for optimal medical treatment    Diet: diabetic diet    Activity: As tolerated    Instructions to Patient: Follow-up PCP, follow-up lipid panel, follow-up diabetes education, sleep study offered as outpatient. Recommended to follow-up and be evaluated for optimal medical treatment    Discharge Medications:      Medication List        START taking these medications      blood glucose test strips  Test 4 times a day & as needed for symptoms of irregular blood glucose. Dispense sufficient amount for indicated testing frequency plus additional to accommodate PRN testing needs. cefdinir 300 MG capsule  Commonly known as: OMNICEF  Take 1 capsule by mouth 2 times daily for 7 days     glimepiride 2 MG tablet  Commonly known as: AMARYL  Take 1 tablet by mouth every morning (before breakfast)     glucose monitoring kit  1 kit by Does not apply route daily     Lancets Misc  1 each by Does not apply route daily     metFORMIN 1000 MG tablet  Commonly known as: GLUCOPHAGE  Take 1 tablet by mouth 2 times daily (with meals)  Start taking on: March 17, 2023               Where to Get Your Medications        These medications were sent to [address] - F [phone]  [address] [facility], [address] [facility] [address]      Phone: [phone]   blood glucose test strips  cefdinir 300 MG capsule  glimepiride 2 MG tablet  glucose monitoring kit  Lancets Misc  metFORMIN 1000 MG tablet         Discharge Procedure Orders   Alcohol prep pad     Lipid Panel   Standing Status: Future Standing Exp. Date: 03/10/24     [facility]   Referral Priority: Routine Referral Type: Eval and Treat   Referral Reason: Specialty Services Required   Number of Visits Requested: 1     Baseline Diagnostic Sleep Study   Standing Status: Future Standing Exp.  Date: 03/10/24     Order Specific Question Answer Comments   Adult or Pediatric Adult Study (>7 Years)    Location For Sleep Study [address] [place] [address]    Pre-Study Patient Questions: Complains of daytime sleepiness    Pre-Study Patient Questions: Snores loudly during sleep    Pre-Study Patient Questions: Reports choking or gasping for breath during sleep    Pre-Study Patient Questions: Impaired attention, concentration, or memory due to poor sleep    Pre-Study Patient Questions: Complains of morning headaches      Sleep Study with PAP Titration   Standing Status: Future Standing Exp. Date: 03/10/24     Order Specific Question Answer Comments   Sleep Study Titration Type Split Night Study (Baseline followed by PAP Titration)    Location For Sleep Study [address] [place] [address]    Pre-Study Patient Questions: Snores loudly during sleep    Pre-Study Patient Questions: Reports choking or gasping for breath during sleep    Pre-Study Patient Questions: Complains of morning headaches    Pre-Study Patient Questions: Impaired attention, concentration, or memory due to poor sleep    Pre-Study Patient Questions: Complains of daytime sleepiness        Time Spent on discharge is  15 mins in patient examination, evaluation, counseling as well as medication reconciliation, prescriptions for required medications, discharge plan and follow up.     Electronically signed by   Sherley Martinez MD  3/11/2023  4:23 PM

## 2023-03-14 ENCOUNTER — HOSPITAL ENCOUNTER (EMERGENCY)
Age: 73
Discharge: HOME | End: 2023-03-14
Attending: EMERGENCY MEDICINE
Payer: MEDICARE

## 2023-03-14 VITALS
RESPIRATION RATE: 18 BRPM | HEART RATE: 91 BPM | SYSTOLIC BLOOD PRESSURE: 174 MMHG | TEMPERATURE: 96.9 F | DIASTOLIC BLOOD PRESSURE: 85 MMHG | OXYGEN SATURATION: 97 %

## 2023-03-14 DIAGNOSIS — R33.9 URINARY RETENTION: Primary | ICD-10-CM

## 2023-03-14 LAB
BILIRUBIN URINE: NEGATIVE
COLOR: YELLOW
EPITHELIAL CELLS UA: ABNORMAL /HPF (ref 0–5)
EST. AVERAGE GLUCOSE BLD GHB EST-MCNC: 272 MG/DL
GLUCOSE UR STRIP.AUTO-MCNC: ABNORMAL MG/DL
HBA1C MFR BLD: 11.1 % (ref 4–6)
KETONES UR STRIP.AUTO-MCNC: ABNORMAL MG/DL
LEUKOCYTE ESTERASE UR QL STRIP.AUTO: ABNORMAL
NITRITE UR QL STRIP.AUTO: NEGATIVE
PROT UR STRIP.AUTO-MCNC: 5.5 MG/DL (ref 5–8)
PROT UR STRIP.AUTO-MCNC: ABNORMAL MG/DL
RBC CLUMPS #/AREA URNS AUTO: ABNORMAL /HPF (ref 0–4)
SPECIFIC GRAVITY UA: 1.02 (ref 1–1.03)
TURBIDITY: CLEAR
URINE HGB: ABNORMAL
UROBILINOGEN, URINE: NORMAL
WBC UA: ABNORMAL /HPF (ref 0–5)

## 2023-03-14 PROCEDURE — 87086 URINE CULTURE/COLONY COUNT: CPT

## 2023-03-14 PROCEDURE — 99283 EMERGENCY DEPT VISIT LOW MDM: CPT

## 2023-03-14 PROCEDURE — 81001 URINALYSIS AUTO W/SCOPE: CPT

## 2023-03-14 ASSESSMENT — ENCOUNTER SYMPTOMS
VOMITING: 0
SHORTNESS OF BREATH: 0
DIARRHEA: 0
ABDOMINAL PAIN: 1
CONSTIPATION: 0
NAUSEA: 0

## 2023-03-14 ASSESSMENT — PAIN - FUNCTIONAL ASSESSMENT: PAIN_FUNCTIONAL_ASSESSMENT: 0-10

## 2023-03-14 ASSESSMENT — PAIN SCALES - GENERAL: PAINLEVEL_OUTOF10: 4

## 2023-03-14 NOTE — DISCHARGE INSTRUCTIONS
Call today or tomorrow to follow up with Miladis Young MD  in 1-2 days. You need to call the urology office with the information provided for you here to schedule a follow up appointment for your urinary retention. You can continue taking your antibiotics as prescribed. Return to the Emergency Department for worsening of pain, no output of urine in the murguia bag, blood in your urine, excessive nausea or vomiting, fever > 101.5, any other care or concern.

## 2023-03-14 NOTE — ED TRIAGE NOTES
Pt arrived to ED 27 via triage. Pt co urinary retention and UTI symptoms. Pt states that he was here recently and diagnosed with a UTI and sent home on antibiotics. Pt states that his pain is minimal but he is no able to urinate. Pt states that last time he urinated was 1800 on 3/13. Pt denies any CP or SOB. Pt is resting on stretcher with call light within reach. Breathing is non labored and no acute distress is noted.    Will continue to follow plan of care

## 2023-03-15 LAB
MICROORGANISM SPEC CULT: NO GROWTH
SPECIMEN DESCRIPTION: NORMAL

## 2023-03-16 ENCOUNTER — HOSPITAL ENCOUNTER (OUTPATIENT)
Age: 73
Setting detail: SPECIMEN
Discharge: HOME | End: 2023-03-16
Payer: MEDICARE

## 2023-03-16 ENCOUNTER — TELEPHONE (OUTPATIENT)
Dept: UROLOGY | Age: 73
End: 2023-03-16

## 2023-03-16 LAB
25(OH)D3 SERPL-MCNC: 13 NG/ML
ABSOLUTE EOS #: 0.13 K/UL (ref 0–0.44)
ABSOLUTE IMMATURE GRANULOCYTE: <0.03 K/UL (ref 0–0.3)
ABSOLUTE LYMPH #: 1.43 K/UL (ref 1.1–3.7)
ABSOLUTE MONO #: 0.55 K/UL (ref 0.1–1.2)
ALBUMIN SERPL-MCNC: 4.2 G/DL (ref 3.5–5.2)
ALBUMIN/GLOBULIN RATIO: 1.7 (ref 1–2.5)
ALP SERPL-CCNC: 92 U/L (ref 40–129)
ALT SERPL-CCNC: 15 U/L (ref 5–41)
ANION GAP SERPL CALCULATED.3IONS-SCNC: 11 MMOL/L (ref 9–17)
AST SERPL-CCNC: 16 U/L
BASOPHILS # BLD: 1 % (ref 0–2)
BASOPHILS ABSOLUTE: 0.04 K/UL (ref 0–0.2)
BILIRUB SERPL-MCNC: 0.9 MG/DL (ref 0.3–1.2)
BILIRUBIN URINE: NEGATIVE
BUN SERPL-MCNC: 15 MG/DL (ref 8–23)
CALCIUM SERPL-MCNC: 9.6 MG/DL (ref 8.6–10.4)
CASTS UA: NORMAL /LPF (ref 0–8)
CHLORIDE SERPL-SCNC: 97 MMOL/L (ref 98–107)
CHOLEST SERPL-MCNC: 148 MG/DL
CHOLESTEROL/HDL RATIO: 3.9
CO2 SERPL-SCNC: 26 MMOL/L (ref 20–31)
COLOR: YELLOW
CREAT SERPL-MCNC: 0.85 MG/DL (ref 0.7–1.2)
EOSINOPHILS RELATIVE PERCENT: 2 % (ref 1–4)
EPITHELIAL CELLS UA: NORMAL /HPF (ref 0–5)
GFR SERPL CREATININE-BSD FRML MDRD: >60 ML/MIN/1.73M2
GLUCOSE SERPL-MCNC: 252 MG/DL (ref 70–99)
GLUCOSE UR STRIP.AUTO-MCNC: ABNORMAL MG/DL
HCT VFR BLD AUTO: 39.2 % (ref 40.7–50.3)
HDLC SERPL-MCNC: 38 MG/DL
HGB BLD-MCNC: 14.2 G/DL (ref 13–17)
IMMATURE GRANULOCYTES: 0 %
KETONES UR STRIP.AUTO-MCNC: NEGATIVE MG/DL
LDLC SERPL CALC-MCNC: 96 MG/DL (ref 0–130)
LEUKOCYTE ESTERASE UR QL STRIP.AUTO: ABNORMAL
LYMPHOCYTES # BLD: 25 % (ref 24–43)
MCH RBC QN AUTO: 31.1 PG (ref 25.2–33.5)
MCHC RBC AUTO-ENTMCNC: 36.2 G/DL (ref 28.4–34.8)
MCV RBC AUTO: 85.8 FL (ref 82.6–102.9)
MONOCYTES # BLD: 10 % (ref 3–12)
NITRITE UR QL STRIP.AUTO: NEGATIVE
NRBC AUTOMATED: 0 PER 100 WBC
PDW BLD-RTO: 12.6 % (ref 11.8–14.4)
PLATELET # BLD AUTO: 190 K/UL (ref 138–453)
PMV BLD AUTO: 9.8 FL (ref 8.1–13.5)
POTASSIUM SERPL-SCNC: 3.3 MMOL/L (ref 3.7–5.3)
PROT SERPL-MCNC: 6.7 G/DL (ref 6.4–8.3)
PROT UR STRIP.AUTO-MCNC: 5.5 MG/DL (ref 5–8)
PROT UR STRIP.AUTO-MCNC: ABNORMAL MG/DL
RBC # BLD: 4.57 M/UL (ref 4.21–5.77)
RBC CLUMPS #/AREA URNS AUTO: NORMAL /HPF (ref 0–4)
SEG NEUTROPHILS: 62 % (ref 36–65)
SEGMENTED NEUTROPHILS ABSOLUTE COUNT: 3.46 K/UL (ref 1.5–8.1)
SODIUM SERPL-SCNC: 134 MMOL/L (ref 135–144)
SPECIFIC GRAVITY UA: 1.02 (ref 1–1.03)
TRIGL SERPL-MCNC: 71 MG/DL
TSH SERPL-ACNC: 2.86 UIU/ML (ref 0.3–5)
TURBIDITY: CLEAR
URINE HGB: ABNORMAL
UROBILINOGEN, URINE: NORMAL
WBC # BLD AUTO: 5.6 K/UL (ref 3.5–11.3)
WBC UA: NORMAL /HPF (ref 0–5)

## 2023-03-16 PROCEDURE — 82306 VITAMIN D 25 HYDROXY: CPT

## 2023-03-16 PROCEDURE — 84443 ASSAY THYROID STIM HORMONE: CPT

## 2023-03-16 PROCEDURE — 81001 URINALYSIS AUTO W/SCOPE: CPT

## 2023-03-16 PROCEDURE — 85025 COMPLETE CBC W/AUTO DIFF WBC: CPT

## 2023-03-16 PROCEDURE — 36415 COLL VENOUS BLD VENIPUNCTURE: CPT

## 2023-03-16 PROCEDURE — 80061 LIPID PANEL: CPT

## 2023-03-16 PROCEDURE — 80053 COMPREHEN METABOLIC PANEL: CPT

## 2023-03-16 NOTE — TELEPHONE ENCOUNTER
Patient stated he was supposed to come in for cath removal on 3/17. Patient does have a May appt. But states that's not the right one. Please advise.

## 2023-03-17 ENCOUNTER — OFFICE VISIT (OUTPATIENT)
Dept: UROLOGY | Age: 73
End: 2023-03-17
Payer: MEDICARE

## 2023-03-17 VITALS
BODY MASS INDEX: 32.1 KG/M2 | WEIGHT: 237 LBS | HEART RATE: 79 BPM | SYSTOLIC BLOOD PRESSURE: 154 MMHG | HEIGHT: 72 IN | DIASTOLIC BLOOD PRESSURE: 82 MMHG

## 2023-03-17 DIAGNOSIS — N13.8 BPH WITH OBSTRUCTION/LOWER URINARY TRACT SYMPTOMS: ICD-10-CM

## 2023-03-17 DIAGNOSIS — N40.1 BPH WITH OBSTRUCTION/LOWER URINARY TRACT SYMPTOMS: ICD-10-CM

## 2023-03-17 DIAGNOSIS — N20.0 NEPHROLITHIASIS: ICD-10-CM

## 2023-03-17 DIAGNOSIS — R33.9 URINARY RETENTION: ICD-10-CM

## 2023-03-17 DIAGNOSIS — N52.9 ERECTILE DYSFUNCTION, UNSPECIFIED ERECTILE DYSFUNCTION TYPE: Primary | ICD-10-CM

## 2023-03-17 PROCEDURE — 99204 OFFICE O/P NEW MOD 45 MIN: CPT | Performed by: UROLOGY

## 2023-03-17 PROCEDURE — 1123F ACP DISCUSS/DSCN MKR DOCD: CPT | Performed by: UROLOGY

## 2023-03-17 RX ORDER — TAMSULOSIN HYDROCHLORIDE 0.4 MG/1
0.4 CAPSULE ORAL DAILY
Qty: 90 CAPSULE | Refills: 1 | Status: SHIPPED | OUTPATIENT
Start: 2023-03-17

## 2023-03-17 RX ORDER — TAMSULOSIN HYDROCHLORIDE 0.4 MG/1
CAPSULE ORAL
COMMUNITY
Start: 2023-03-15

## 2023-03-17 RX ORDER — ATORVASTATIN CALCIUM 40 MG/1
TABLET, FILM COATED ORAL
COMMUNITY
Start: 2023-03-15

## 2023-03-17 RX ORDER — LISINOPRIL AND HYDROCHLOROTHIAZIDE 12.5; 1 MG/1; MG/1
TABLET ORAL
COMMUNITY
Start: 2023-03-15

## 2023-03-17 NOTE — PROGRESS NOTES
Irina Dior MD  [facility] Consultation / Follow up Visit    Patient:  Marissa Stovall  YOB: 1950  Date: 3/17/2023    HISTORY OF PRESENT ILLNESS:   The patient is a 71 y.o. male who presents today for follow-up for the following problem(s):   Urinary retention  BPH with LUTS  Nephrolithiasis  Erectile dysfunction  Overall the problem(s) : show no change. Associated Symptoms: No dysuria, gross hematuria. Pain Severity:      Today visit:   Continue murguia since discharge early March from hospital  No hematuria or dysuria  First occurrence of retention  Denies lack of ambulation or constipation  ED having partial erections but not interested in treatment at this time. Discussed medication options, and inc tx to [facility] or [facility] or even surgery with IPP as options with starting at medications first. He declines at this time. Summary of old records:   3/9/2023 suprapubic pain was discharged with concern of cystitis   3/14/2023 retention for appx 1L and sent home with murguia     Reviewed Ucxs as below and CBC and CMP from tdy   Component 3/14/23 0433   Specimen Description . URINE,STRAIGHT CATHETER    Culture NO GROWTH      Specimen Description . CLEAN CATCH URINE    Culture NO SIGNIFICANT GROWTH        Last several PSA's:  No results found for: PSA    Last total testosterone:  No results found for: TESTOSTERONE    Urinalysis today:  No results found for this visit on 03/17/23. Last BUN and creatinine:  Lab Results   Component Value Date    BUN 15 03/16/2023     Lab Results   Component Value Date    CREATININE 0.85 03/16/2023       Imaging Reviewed during this Office Visit:   (results were independently reviewed by physician and radiology report verified)    PAST MEDICAL, FAMILY AND SOCIAL HISTORY UPDATE:  No past medical history on file. No past surgical history on file. No family history on file.   Outpatient Medications Marked as Taking for the 3/17/23 encounter (Office Visit) with Sukhwinder Berumen García Calles MD   Medication Sig Dispense Refill    atorvastatin (LIPITOR) 40 MG tablet       lisinopril-hydroCHLOROthiazide (PRINZIDE;ZESTORETIC) 10-12.5 MG per tablet       tamsulosin (FLOMAX) 0.4 MG capsule       glucose monitoring (FREESTYLE FREEDOM) kit 1 kit by Does not apply route daily 1 kit 0    Lancets MISC 1 each by Does not apply route daily 100 each 3    glimepiride (AMARYL) 2 MG tablet Take 1 tablet by mouth every morning (before breakfast) 30 tablet 0    metFORMIN (GLUCOPHAGE) 1000 MG tablet Take 1 tablet by mouth 2 times daily (with meals) 60 tablet 0    blood glucose monitor strips Test 4 times a day & as needed for symptoms of irregular blood glucose. Dispense sufficient amount for indicated testing frequency plus additional to accommodate PRN testing needs. 100 strip 0       Patient has no known allergies. Social History     Tobacco Use   Smoking Status Never   Smokeless Tobacco Not on file       Social History     Substance and Sexual Activity   Alcohol Use Yes    Comment: occasionally       REVIEW OF SYSTEMS:  Constitutional: negative  Eyes: negative  Respiratory: negative  Cardiovascular: negative  Gastrointestinal: negative  Musculoskeletal: negative  Genitourinary: negative  Skin: negative   Neurological: negative  Hematological/Lymphatic: negative  Psychological: negative    Physical Exam:      Vitals:    03/17/23 0851   BP: (!) 154/82   Pulse: 79     NAD  RRR  Symm chest rise  Soft NT ND  Hale in place      Assessment and Plan      1. Erectile dysfunction, unspecified erectile dysfunction type    2. BPH with obstruction/lower urinary tract symptoms    3. Nephrolithiasis    4. Urinary retention           Plan:      Return in about 3 months (around 6/17/2023). ED - having partial erections but not interested in treatment at this time. Discussed medication options, and inc tx to [facility] or [facility] or even surgery with IPP as options with starting at medications first. He declines at this time.    BPH with LUTS - Flomax 0.4 mg po qd for BPH symptoms. Retention - fill and spill this visit, Flomax 0.4 mg po qd for BPH symptoms. Nephrolithiasis- never needed surgery, no sxs at this time        Jose Damon MD PGY3  Urology     Attending Physician Statement  I have discussed the care of the patient, including pertinent history and exam findings, with the resident/GERMÁN. I have seen and examined the patient and the key elements of all parts of the encounter have been performed by me. I have reviewed all laboratory findings and imaging reports/films. I agree with the assessment, plan and orders as documented by the resident.   (GC Modifier)

## 2023-03-17 NOTE — PROGRESS NOTES
Fill and pull procedure  Procedure Date: 3/17/23    Verbal consent obtained from patient prior to procedure. Patient arrived with old catheter in place for fill and pull. Bladder installation of 160 ml of sterile water per gravity until patient felt the sensation of bladder capacity fullness. Murguia ballon deflated and murguia removed without complication. Patient voided with return of 100ml. PVR done with 19ml residual. Per Dr. Ana Szymanski leave murguia catheter out. Patient tolerated procedure well and without complications. Encouraged to stay well hydrated. Verbalized understanding.

## 2023-04-24 ENCOUNTER — HOSPITAL ENCOUNTER (OUTPATIENT)
Age: 73
Setting detail: SPECIMEN
Discharge: HOME | End: 2023-04-24

## 2023-04-25 LAB
MICROORGANISM SPEC CULT: NORMAL
SPECIMEN DESCRIPTION: NORMAL

## 2023-05-05 DIAGNOSIS — E11.9 TYPE 2 DIABETES MELLITUS WITHOUT COMPLICATIONS (HCC): ICD-10-CM

## 2023-05-09 ENCOUNTER — HOSPITAL ENCOUNTER (OUTPATIENT)
Age: 73
Discharge: HOME | End: 2023-05-09
Payer: MEDICARE

## 2023-05-09 LAB — PROSTATE SPECIFIC ANTIGEN: 4.96 NG/ML

## 2023-05-09 PROCEDURE — 36415 COLL VENOUS BLD VENIPUNCTURE: CPT

## 2023-05-09 PROCEDURE — G0103 PSA SCREENING: HCPCS

## 2023-05-26 DIAGNOSIS — E11.9 TYPE 2 DIABETES MELLITUS WITHOUT COMPLICATIONS (HCC): ICD-10-CM

## 2023-10-11 ENCOUNTER — TELEPHONE (OUTPATIENT)
Dept: UROLOGY | Age: 73
End: 2023-10-11

## 2023-10-11 NOTE — TELEPHONE ENCOUNTER
Patient stated he was returning a call regarding the office changing his appointment. Patient stated he wants a different appointment in the AM and would like a call as soon as possible as this is currently not available. He can be reached at [phone]. Okay to leave a message.

## 2025-07-28 ENCOUNTER — APPOINTMENT (OUTPATIENT)
Dept: GENERAL RADIOLOGY | Age: 75
DRG: 660 | End: 2025-07-28
Payer: MEDICARE

## 2025-07-28 ENCOUNTER — APPOINTMENT (OUTPATIENT)
Dept: CT IMAGING | Age: 75
DRG: 660 | End: 2025-07-28
Payer: MEDICARE

## 2025-07-28 ENCOUNTER — HOSPITAL ENCOUNTER (OUTPATIENT)
Age: 75
Discharge: HOME | DRG: 660 | End: 2025-07-28
Payer: MEDICARE

## 2025-07-28 ENCOUNTER — HOSPITAL ENCOUNTER (INPATIENT)
Age: 75
LOS: 3 days | Discharge: HOME | DRG: 660 | End: 2025-07-31
Attending: STUDENT IN AN ORGANIZED HEALTH CARE EDUCATION/TRAINING PROGRAM | Admitting: INTERNAL MEDICINE
Payer: MEDICARE

## 2025-07-28 ENCOUNTER — APPOINTMENT (OUTPATIENT)
Dept: ULTRASOUND IMAGING | Age: 75
DRG: 660 | End: 2025-07-28
Payer: MEDICARE

## 2025-07-28 ENCOUNTER — TELEPHONE (OUTPATIENT)
Age: 75
End: 2025-07-28

## 2025-07-28 DIAGNOSIS — N20.0 BILATERAL KIDNEY STONES: ICD-10-CM

## 2025-07-28 DIAGNOSIS — E08.00 DIABETES MELLITUS DUE TO UNDERLYING CONDITION WITH HYPEROSMOLARITY WITHOUT COMA, WITHOUT LONG-TERM CURRENT USE OF INSULIN (HCC): ICD-10-CM

## 2025-07-28 DIAGNOSIS — Z87.442 HISTORY OF NEPHROLITHIASIS: ICD-10-CM

## 2025-07-28 DIAGNOSIS — R79.89 ELEVATED SERUM CREATININE: ICD-10-CM

## 2025-07-28 DIAGNOSIS — N13.9 ACUTE UNILATERAL OBSTRUCTIVE UROPATHY: ICD-10-CM

## 2025-07-28 DIAGNOSIS — N17.9 ACUTE KIDNEY INJURY: Primary | ICD-10-CM

## 2025-07-28 DIAGNOSIS — N30.01 ACUTE CYSTITIS WITH HEMATURIA: ICD-10-CM

## 2025-07-28 PROBLEM — I10 PRIMARY HYPERTENSION: Status: ACTIVE | Noted: 2025-07-28

## 2025-07-28 PROBLEM — N18.9 ACUTE KIDNEY INJURY SUPERIMPOSED ON CHRONIC KIDNEY DISEASE: Status: ACTIVE | Noted: 2025-07-28

## 2025-07-28 LAB
ALBUMIN SERPL-MCNC: 4.7 G/DL (ref 3.5–5.2)
ALBUMIN/GLOB SERPL: 1.6 {RATIO} (ref 1–2.5)
ALP SERPL-CCNC: 111 U/L (ref 40–129)
ALT SERPL-CCNC: 9 U/L (ref 10–50)
ANION GAP SERPL CALCULATED.3IONS-SCNC: 13 MMOL/L (ref 9–16)
ANION GAP SERPL CALCULATED.3IONS-SCNC: 15 MMOL/L (ref 9–16)
AST SERPL-CCNC: 17 U/L (ref 10–50)
BACTERIA URNS QL MICRO: ABNORMAL
BASOPHILS # BLD: 0.1 K/UL (ref 0–0.2)
BASOPHILS NFR BLD: 1 % (ref 0–2)
BILIRUB SERPL-MCNC: 0.5 MG/DL (ref 0–1.2)
BILIRUB UR QL STRIP: NEGATIVE
BUN SERPL-MCNC: 68 MG/DL (ref 8–23)
BUN SERPL-MCNC: 71 MG/DL (ref 8–23)
CALCIUM SERPL-MCNC: 9.5 MG/DL (ref 8.6–10.4)
CALCIUM SERPL-MCNC: 9.8 MG/DL (ref 8.6–10.4)
CHLORIDE SERPL-SCNC: 101 MMOL/L (ref 98–107)
CHLORIDE SERPL-SCNC: 102 MMOL/L (ref 98–107)
CLARITY UR: ABNORMAL
CO2 SERPL-SCNC: 23 MMOL/L (ref 20–31)
CO2 SERPL-SCNC: 26 MMOL/L (ref 20–31)
COLOR UR: YELLOW
CREAT SERPL-MCNC: 3.6 MG/DL (ref 0.7–1.2)
CREAT SERPL-MCNC: 4 MG/DL (ref 0.7–1.2)
CREAT UR-MCNC: 95.5 MG/DL (ref 39–259)
EOSINOPHIL # BLD: 0.1 K/UL (ref 0–0.4)
EOSINOPHILS RELATIVE PERCENT: 2 % (ref 1–4)
EPI CELLS #/AREA URNS HPF: ABNORMAL /HPF (ref 0–5)
ERYTHROCYTE [DISTWIDTH] IN BLOOD BY AUTOMATED COUNT: 12.1 % (ref 11.8–14.4)
ERYTHROCYTE [DISTWIDTH] IN BLOOD BY AUTOMATED COUNT: 12.8 % (ref 12.5–15.4)
FREE KAPPA/LAMBDA RATIO: 1.16 (ref 0.22–1.74)
GFR, ESTIMATED: 15 ML/MIN/1.73M2
GFR, ESTIMATED: 17 ML/MIN/1.73M2
GLUCOSE SERPL-MCNC: 163 MG/DL (ref 74–99)
GLUCOSE SERPL-MCNC: 182 MG/DL (ref 74–99)
GLUCOSE UR STRIP-MCNC: NEGATIVE MG/DL
HCT VFR BLD AUTO: 28.7 % (ref 40.7–50.3)
HCT VFR BLD AUTO: 29.4 % (ref 41–53)
HGB BLD-MCNC: 10.3 G/DL (ref 13.5–17.5)
HGB BLD-MCNC: 9.7 G/DL (ref 13–17)
HGB UR QL STRIP.AUTO: ABNORMAL
KAPPA LC FREE SER-MCNC: 62.2 MG/L
KETONES UR STRIP-MCNC: NEGATIVE MG/DL
LAMBDA LC FREE SERPL-MCNC: 53.4 MG/L (ref 4.2–27.7)
LEUKOCYTE ESTERASE UR QL STRIP: ABNORMAL
LYMPHOCYTES NFR BLD: 1.3 K/UL (ref 1–4.8)
LYMPHOCYTES RELATIVE PERCENT: 19 % (ref 24–44)
MAGNESIUM SERPL-MCNC: 2.2 MG/DL (ref 1.6–2.4)
MCH RBC QN AUTO: 31.1 PG (ref 25.2–33.5)
MCH RBC QN AUTO: 31.6 PG (ref 26–34)
MCHC RBC AUTO-ENTMCNC: 33.8 G/DL (ref 28.4–34.8)
MCHC RBC AUTO-ENTMCNC: 35 G/DL (ref 31–37)
MCV RBC AUTO: 90.4 FL (ref 80–100)
MCV RBC AUTO: 92 FL (ref 82.6–102.9)
MONOCYTES NFR BLD: 0.5 K/UL (ref 0.1–1.2)
MONOCYTES NFR BLD: 7 % (ref 2–11)
NEUTROPHILS NFR BLD: 71 % (ref 36–66)
NEUTS SEG NFR BLD: 4.9 K/UL (ref 1.8–7.7)
NITRITE UR QL STRIP: POSITIVE
NRBC BLD-RTO: 0 PER 100 WBC
PH UR STRIP: 7.5 [PH] (ref 5–8)
PLATELET # BLD AUTO: 208 K/UL (ref 138–453)
PLATELET # BLD AUTO: 229 K/UL (ref 140–450)
PMV BLD AUTO: 7.3 FL (ref 6–12)
PMV BLD AUTO: 9.7 FL (ref 8.1–13.5)
POTASSIUM SERPL-SCNC: 4.3 MMOL/L (ref 3.7–5.3)
POTASSIUM SERPL-SCNC: 5 MMOL/L (ref 3.7–5.3)
PROT SERPL-MCNC: 7.7 G/DL (ref 6.6–8.7)
PROT UR STRIP-MCNC: ABNORMAL MG/DL
RBC # BLD AUTO: 3.12 M/UL (ref 4.21–5.77)
RBC # BLD AUTO: 3.25 M/UL (ref 4.5–5.9)
RBC #/AREA URNS HPF: ABNORMAL /HPF (ref 0–2)
SODIUM SERPL-SCNC: 140 MMOL/L (ref 136–145)
SODIUM SERPL-SCNC: 140 MMOL/L (ref 136–145)
SP GR UR STRIP: 1.01 (ref 1–1.03)
TOTAL PROTEIN, URINE: 72 MG/DL
URATE SERPL-MCNC: 7.8 MG/DL (ref 3.4–7)
URINE TOTAL PROTEIN CREATININE RATIO: 0.75 (ref 0–0.2)
UROBILINOGEN UR STRIP-ACNC: NORMAL EU/DL (ref 0–1)
WBC #/AREA URNS HPF: ABNORMAL /HPF (ref 0–5)
WBC OTHER # BLD: 6 K/UL (ref 3.5–11.3)
WBC OTHER # BLD: 6.9 K/UL (ref 3.5–11)

## 2025-07-28 PROCEDURE — 80048 BASIC METABOLIC PNL TOTAL CA: CPT

## 2025-07-28 PROCEDURE — 6360000002 HC RX W HCPCS

## 2025-07-28 PROCEDURE — 83735 ASSAY OF MAGNESIUM: CPT

## 2025-07-28 PROCEDURE — 96374 THER/PROPH/DIAG INJ IV PUSH: CPT

## 2025-07-28 PROCEDURE — 74176 CT ABD & PELVIS W/O CONTRAST: CPT

## 2025-07-28 PROCEDURE — 76770 US EXAM ABDO BACK WALL COMP: CPT

## 2025-07-28 PROCEDURE — 84155 ASSAY OF PROTEIN SERUM: CPT

## 2025-07-28 PROCEDURE — 83935 ASSAY OF URINE OSMOLALITY: CPT

## 2025-07-28 PROCEDURE — 74018 RADEX ABDOMEN 1 VIEW: CPT

## 2025-07-28 PROCEDURE — 36415 COLL VENOUS BLD VENIPUNCTURE: CPT

## 2025-07-28 PROCEDURE — 1200000000 HC SEMI PRIVATE

## 2025-07-28 PROCEDURE — 84550 ASSAY OF BLOOD/URIC ACID: CPT

## 2025-07-28 PROCEDURE — 84300 ASSAY OF URINE SODIUM: CPT

## 2025-07-28 PROCEDURE — 2500000003 HC RX 250 WO HCPCS

## 2025-07-28 PROCEDURE — 82570 ASSAY OF URINE CREATININE: CPT

## 2025-07-28 PROCEDURE — 83516 IMMUNOASSAY NONANTIBODY: CPT

## 2025-07-28 PROCEDURE — 86038 ANTINUCLEAR ANTIBODIES: CPT

## 2025-07-28 PROCEDURE — 86225 DNA ANTIBODY NATIVE: CPT

## 2025-07-28 PROCEDURE — 85025 COMPLETE CBC W/AUTO DIFF WBC: CPT

## 2025-07-28 PROCEDURE — 81001 URINALYSIS AUTO W/SCOPE: CPT

## 2025-07-28 PROCEDURE — 87077 CULTURE AEROBIC IDENTIFY: CPT

## 2025-07-28 PROCEDURE — 84165 PROTEIN E-PHORESIS SERUM: CPT

## 2025-07-28 PROCEDURE — 83521 IG LIGHT CHAINS FREE EACH: CPT

## 2025-07-28 PROCEDURE — 84156 ASSAY OF PROTEIN URINE: CPT

## 2025-07-28 PROCEDURE — 80053 COMPREHEN METABOLIC PANEL: CPT

## 2025-07-28 PROCEDURE — 87086 URINE CULTURE/COLONY COUNT: CPT

## 2025-07-28 PROCEDURE — 2580000003 HC RX 258

## 2025-07-28 PROCEDURE — 86334 IMMUNOFIX E-PHORESIS SERUM: CPT

## 2025-07-28 PROCEDURE — 99222 1ST HOSP IP/OBS MODERATE 55: CPT

## 2025-07-28 PROCEDURE — 99285 EMERGENCY DEPT VISIT HI MDM: CPT

## 2025-07-28 PROCEDURE — 85027 COMPLETE CBC AUTOMATED: CPT

## 2025-07-28 RX ORDER — 0.9 % SODIUM CHLORIDE 0.9 %
1000 INTRAVENOUS SOLUTION INTRAVENOUS ONCE
Status: COMPLETED | OUTPATIENT
Start: 2025-07-28 | End: 2025-07-28

## 2025-07-28 RX ORDER — MORPHINE SULFATE 2 MG/ML
2 INJECTION, SOLUTION INTRAMUSCULAR; INTRAVENOUS
Refills: 0 | Status: DISCONTINUED | OUTPATIENT
Start: 2025-07-28 | End: 2025-07-31 | Stop reason: HOSPADM

## 2025-07-28 RX ORDER — MORPHINE SULFATE 4 MG/ML
4 INJECTION, SOLUTION INTRAMUSCULAR; INTRAVENOUS
Refills: 0 | Status: DISCONTINUED | OUTPATIENT
Start: 2025-07-28 | End: 2025-07-31 | Stop reason: HOSPADM

## 2025-07-28 RX ADMIN — SODIUM CHLORIDE 1000 ML: 0.9 INJECTION, SOLUTION INTRAVENOUS at 20:59

## 2025-07-28 RX ADMIN — WATER 1000 MG: 1 INJECTION INTRAMUSCULAR; INTRAVENOUS; SUBCUTANEOUS at 21:00

## 2025-07-28 ASSESSMENT — PAIN - FUNCTIONAL ASSESSMENT: PAIN_FUNCTIONAL_ASSESSMENT: 0-10

## 2025-07-28 ASSESSMENT — PAIN SCALES - GENERAL: PAINLEVEL_OUTOF10: 0

## 2025-07-28 NOTE — TELEPHONE ENCOUNTER
Received labs on . Adrian Gutierrez.  Creatinine is 4 mg/dL with GFR of 15.  I called patient's wife.  I advised her to take him to [place] [facility] for imaging and further evaluation.  She understood and she will be taking him to [place] [facility].

## 2025-07-28 NOTE — ED PROVIDER NOTES
[facility] Emergency Department  [address]  [place] [address]  Phone: [phone]  Fax: [phone]      Patient Name:  Adrian Gutierrez  Medical Record Number:  [id number]  YOB: 1950  Date of Service:  7/28/2025  Primary Care Physician:  Han Mathis MD      CHIEF COMPLAINT:       Chief Complaint   Patient presents with    abnornal labs     Wife reports pt had blood work done today that showed elevated Creatinine, called by dr to come to er today for scans and possible admission. Pt reports he may have passed a kidney stone last week.        HISTORY OF PRESENT ILLNESS:    Adrian Gutierrez is a 74 y.o. male who presents with the complaint of abnormal labs.  Patient had a new patient appointment with Dr. Akers with nephrology secondary to history of chronic kidney disease and diabetes.  He had outpatient labs that were ordered by the nephrologist performed late this morning which showed a BUN of 71, creatinine of 4.0 with a GFR of 15.  He had a normal sodium, potassium, chloride and CO2 levels.  He was called by the nephrologist this afternoon and instructed to come to the ER for further evaluation and also to have imaging performed to rule out obstructive uropathy.  Patient does have a history of kidney stones.  The nephrologist wanted a KUB and a renal ultrasound performed if available.    Upon arrival patient has no complaints reports he feels fairly well, thinks he may have passed a kidney stone 2 or 3 days ago but has had no gross hematuria, nausea, vomiting, fatigue, fevers or chills.    CURRENT MEDICATIONS:      Previous Medications    AMLODIPINE (NORVASC) 5 MG TABLET    Take 1 tablet by mouth daily    ATORVASTATIN (LIPITOR) 40 MG TABLET        BLOOD GLUCOSE MONITOR STRIPS    Test 4 times a day & as needed for symptoms of irregular blood glucose. Dispense sufficient amount for indicated testing frequency plus additional to accommodate PRN testing needs.    CHLORTHALIDONE

## 2025-07-29 ENCOUNTER — ANESTHESIA EVENT (OUTPATIENT)
Dept: OPERATING ROOM | Age: 75
DRG: 660 | End: 2025-07-29
Payer: MEDICARE

## 2025-07-29 ENCOUNTER — ANESTHESIA (OUTPATIENT)
Dept: OPERATING ROOM | Age: 75
DRG: 660 | End: 2025-07-29
Payer: MEDICARE

## 2025-07-29 ENCOUNTER — APPOINTMENT (OUTPATIENT)
Dept: GENERAL RADIOLOGY | Age: 75
DRG: 660 | End: 2025-07-29
Payer: MEDICARE

## 2025-07-29 LAB
ALBUMIN SERPL-MCNC: 3.8 G/DL (ref 3.5–5.2)
ALBUMIN/GLOB SERPL: 1.6 {RATIO} (ref 1–2.5)
ALP SERPL-CCNC: 75 U/L (ref 40–129)
ALT SERPL-CCNC: 7 U/L (ref 10–50)
ANION GAP SERPL CALCULATED.3IONS-SCNC: 10 MMOL/L (ref 9–16)
AST SERPL-CCNC: 14 U/L (ref 10–50)
BASOPHILS # BLD: 0 K/UL (ref 0–0.2)
BASOPHILS NFR BLD: 1 % (ref 0–2)
BILIRUB SERPL-MCNC: 0.5 MG/DL (ref 0–1.2)
BUN SERPL-MCNC: 61 MG/DL (ref 8–23)
CALCIUM SERPL-MCNC: 8.8 MG/DL (ref 8.6–10.4)
CHLORIDE SERPL-SCNC: 107 MMOL/L (ref 98–107)
CO2 SERPL-SCNC: 25 MMOL/L (ref 20–31)
CREAT SERPL-MCNC: 3.3 MG/DL (ref 0.7–1.2)
EOSINOPHIL # BLD: 0.1 K/UL (ref 0–0.4)
EOSINOPHILS RELATIVE PERCENT: 3 % (ref 1–4)
ERYTHROCYTE [DISTWIDTH] IN BLOOD BY AUTOMATED COUNT: 12.7 % (ref 12.5–15.4)
GFR, ESTIMATED: 19 ML/MIN/1.73M2
GLUCOSE BLD-MCNC: 103 MG/DL (ref 75–110)
GLUCOSE BLD-MCNC: 214 MG/DL (ref 75–110)
GLUCOSE BLD-MCNC: 300 MG/DL (ref 75–110)
GLUCOSE BLD-MCNC: 92 MG/DL (ref 75–110)
GLUCOSE SERPL-MCNC: 119 MG/DL (ref 74–99)
HCT VFR BLD AUTO: 25.5 % (ref 41–53)
HGB BLD-MCNC: 8.9 G/DL (ref 13.5–17.5)
INR PPP: 1.1 (ref 0.9–1.2)
LYMPHOCYTES NFR BLD: 1.1 K/UL (ref 1–4.8)
LYMPHOCYTES RELATIVE PERCENT: 21 % (ref 24–44)
MAGNESIUM SERPL-MCNC: 2.1 MG/DL (ref 1.6–2.4)
MCH RBC QN AUTO: 31.5 PG (ref 26–34)
MCHC RBC AUTO-ENTMCNC: 34.8 G/DL (ref 31–37)
MCV RBC AUTO: 90.5 FL (ref 80–100)
MONOCYTES NFR BLD: 0.5 K/UL (ref 0.1–1.2)
MONOCYTES NFR BLD: 9 % (ref 2–11)
NEUTROPHILS NFR BLD: 66 % (ref 36–66)
NEUTS SEG NFR BLD: 3.6 K/UL (ref 1.8–7.7)
PLATELET # BLD AUTO: 169 K/UL (ref 140–450)
PMV BLD AUTO: 7.5 FL (ref 6–12)
POTASSIUM SERPL-SCNC: 4.3 MMOL/L (ref 3.7–5.3)
PROT SERPL-MCNC: 6.2 G/DL (ref 6.6–8.7)
PROTHROMBIN TIME: 14.4 SEC (ref 11.8–14.6)
RBC # BLD AUTO: 2.82 M/UL (ref 4.5–5.9)
SODIUM SERPL-SCNC: 142 MMOL/L (ref 136–145)
SODIUM UR-SCNC: 71 MMOL/L
TOTAL PROTEIN, URINE: 102 MG/DL
WBC OTHER # BLD: 5.4 K/UL (ref 3.5–11)

## 2025-07-29 PROCEDURE — 2580000003 HC RX 258

## 2025-07-29 PROCEDURE — 82365 CALCULUS SPECTROSCOPY: CPT

## 2025-07-29 PROCEDURE — 7100000001 HC PACU RECOVERY - ADDTL 15 MIN: Performed by: UROLOGY

## 2025-07-29 PROCEDURE — 3700000001 HC ADD 15 MINUTES (ANESTHESIA): Performed by: UROLOGY

## 2025-07-29 PROCEDURE — 85610 PROTHROMBIN TIME: CPT

## 2025-07-29 PROCEDURE — 6360000002 HC RX W HCPCS: Performed by: ANESTHESIOLOGY

## 2025-07-29 PROCEDURE — 1200000000 HC SEMI PRIVATE

## 2025-07-29 PROCEDURE — 6360000002 HC RX W HCPCS

## 2025-07-29 PROCEDURE — 0TC68ZZ EXTIRPATION OF MATTER FROM RIGHT URETER, VIA NATURAL OR ARTIFICIAL OPENING ENDOSCOPIC: ICD-10-PCS | Performed by: UROLOGY

## 2025-07-29 PROCEDURE — 99222 1ST HOSP IP/OBS MODERATE 55: CPT | Performed by: INTERNAL MEDICINE

## 2025-07-29 PROCEDURE — 0T788DZ DILATION OF BILATERAL URETERS WITH INTRALUMINAL DEVICE, VIA NATURAL OR ARTIFICIAL OPENING ENDOSCOPIC: ICD-10-PCS | Performed by: UROLOGY

## 2025-07-29 PROCEDURE — 2709999900 HC NON-CHARGEABLE SUPPLY: Performed by: UROLOGY

## 2025-07-29 PROCEDURE — 2500000003 HC RX 250 WO HCPCS

## 2025-07-29 PROCEDURE — 83735 ASSAY OF MAGNESIUM: CPT

## 2025-07-29 PROCEDURE — 80053 COMPREHEN METABOLIC PANEL: CPT

## 2025-07-29 PROCEDURE — 36415 COLL VENOUS BLD VENIPUNCTURE: CPT

## 2025-07-29 PROCEDURE — 6370000000 HC RX 637 (ALT 250 FOR IP)

## 2025-07-29 PROCEDURE — 3600000003 HC SURGERY LEVEL 3 BASE: Performed by: UROLOGY

## 2025-07-29 PROCEDURE — 3600000013 HC SURGERY LEVEL 3 ADDTL 15MIN: Performed by: UROLOGY

## 2025-07-29 PROCEDURE — C2617 STENT, NON-COR, TEM W/O DEL: HCPCS | Performed by: UROLOGY

## 2025-07-29 PROCEDURE — 3700000000 HC ANESTHESIA ATTENDED CARE: Performed by: UROLOGY

## 2025-07-29 PROCEDURE — 2580000003 HC RX 258: Performed by: ANESTHESIOLOGY

## 2025-07-29 PROCEDURE — 7100000000 HC PACU RECOVERY - FIRST 15 MIN: Performed by: UROLOGY

## 2025-07-29 PROCEDURE — 82947 ASSAY GLUCOSE BLOOD QUANT: CPT

## 2025-07-29 PROCEDURE — C1769 GUIDE WIRE: HCPCS | Performed by: UROLOGY

## 2025-07-29 PROCEDURE — 99232 SBSQ HOSP IP/OBS MODERATE 35: CPT | Performed by: INTERNAL MEDICINE

## 2025-07-29 PROCEDURE — 85025 COMPLETE CBC W/AUTO DIFF WBC: CPT

## 2025-07-29 DEVICE — URETERAL STENT
Type: IMPLANTABLE DEVICE | Site: URETER | Status: FUNCTIONAL
Brand: POLARIS™ ULTRA

## 2025-07-29 RX ORDER — ATORVASTATIN CALCIUM 40 MG/1
40 TABLET, FILM COATED ORAL NIGHTLY
Status: DISCONTINUED | OUTPATIENT
Start: 2025-07-29 | End: 2025-07-31 | Stop reason: HOSPADM

## 2025-07-29 RX ORDER — ONDANSETRON 2 MG/ML
4 INJECTION INTRAMUSCULAR; INTRAVENOUS
Status: CANCELLED | OUTPATIENT
Start: 2025-07-29

## 2025-07-29 RX ORDER — KETOROLAC TROMETHAMINE 30 MG/ML
INJECTION, SOLUTION INTRAMUSCULAR; INTRAVENOUS
Status: DISCONTINUED | OUTPATIENT
Start: 2025-07-29 | End: 2025-07-29 | Stop reason: SDUPTHER

## 2025-07-29 RX ORDER — MIDAZOLAM HYDROCHLORIDE 2 MG/2ML
2 INJECTION, SOLUTION INTRAMUSCULAR; INTRAVENOUS
Status: CANCELLED | OUTPATIENT
Start: 2025-07-29

## 2025-07-29 RX ORDER — ENOXAPARIN SODIUM 100 MG/ML
30 INJECTION SUBCUTANEOUS DAILY
Status: DISCONTINUED | OUTPATIENT
Start: 2025-07-29 | End: 2025-07-31 | Stop reason: HOSPADM

## 2025-07-29 RX ORDER — METOCLOPRAMIDE HYDROCHLORIDE 5 MG/ML
10 INJECTION INTRAMUSCULAR; INTRAVENOUS
Status: CANCELLED | OUTPATIENT
Start: 2025-07-29

## 2025-07-29 RX ORDER — SODIUM CHLORIDE 0.9 % (FLUSH) 0.9 %
5-40 SYRINGE (ML) INJECTION EVERY 12 HOURS SCHEDULED
Status: CANCELLED | OUTPATIENT
Start: 2025-07-29

## 2025-07-29 RX ORDER — SODIUM CHLORIDE, SODIUM LACTATE, POTASSIUM CHLORIDE, CALCIUM CHLORIDE 600; 310; 30; 20 MG/100ML; MG/100ML; MG/100ML; MG/100ML
INJECTION, SOLUTION INTRAVENOUS
Status: DISCONTINUED | OUTPATIENT
Start: 2025-07-29 | End: 2025-07-29 | Stop reason: SDUPTHER

## 2025-07-29 RX ORDER — ACETAMINOPHEN 325 MG/1
650 TABLET ORAL EVERY 6 HOURS PRN
Status: DISCONTINUED | OUTPATIENT
Start: 2025-07-29 | End: 2025-07-31 | Stop reason: HOSPADM

## 2025-07-29 RX ORDER — SODIUM CHLORIDE 0.9 % (FLUSH) 0.9 %
5-40 SYRINGE (ML) INJECTION EVERY 12 HOURS SCHEDULED
Status: DISCONTINUED | OUTPATIENT
Start: 2025-07-29 | End: 2025-07-31 | Stop reason: HOSPADM

## 2025-07-29 RX ORDER — SODIUM CHLORIDE 9 MG/ML
INJECTION, SOLUTION INTRAVENOUS PRN
Status: CANCELLED | OUTPATIENT
Start: 2025-07-29

## 2025-07-29 RX ORDER — SODIUM CHLORIDE 0.9 % (FLUSH) 0.9 %
5-40 SYRINGE (ML) INJECTION PRN
Status: CANCELLED | OUTPATIENT
Start: 2025-07-29

## 2025-07-29 RX ORDER — GLYCOPYRROLATE 0.2 MG/ML
0.4 INJECTION INTRAMUSCULAR; INTRAVENOUS ONCE
Status: CANCELLED | OUTPATIENT
Start: 2025-07-29 | End: 2025-07-29

## 2025-07-29 RX ORDER — SODIUM CHLORIDE 0.9 % (FLUSH) 0.9 %
5-40 SYRINGE (ML) INJECTION PRN
Status: DISCONTINUED | OUTPATIENT
Start: 2025-07-29 | End: 2025-07-31 | Stop reason: HOSPADM

## 2025-07-29 RX ORDER — LABETALOL HYDROCHLORIDE 5 MG/ML
10 INJECTION, SOLUTION INTRAVENOUS
Status: CANCELLED | OUTPATIENT
Start: 2025-07-29

## 2025-07-29 RX ORDER — MORPHINE SULFATE 2 MG/ML
2 INJECTION, SOLUTION INTRAMUSCULAR; INTRAVENOUS EVERY 5 MIN PRN
Refills: 0 | Status: CANCELLED | OUTPATIENT
Start: 2025-07-29

## 2025-07-29 RX ORDER — OXYCODONE AND ACETAMINOPHEN 5; 325 MG/1; MG/1
1 TABLET ORAL
Refills: 0 | Status: CANCELLED | OUTPATIENT
Start: 2025-07-29

## 2025-07-29 RX ORDER — ONDANSETRON 2 MG/ML
INJECTION INTRAMUSCULAR; INTRAVENOUS
Status: DISCONTINUED | OUTPATIENT
Start: 2025-07-29 | End: 2025-07-29 | Stop reason: SDUPTHER

## 2025-07-29 RX ORDER — ONDANSETRON 4 MG/1
4 TABLET, ORALLY DISINTEGRATING ORAL EVERY 8 HOURS PRN
Status: DISCONTINUED | OUTPATIENT
Start: 2025-07-29 | End: 2025-07-31 | Stop reason: HOSPADM

## 2025-07-29 RX ORDER — SODIUM CHLORIDE 9 MG/ML
INJECTION, SOLUTION INTRAVENOUS CONTINUOUS
Status: DISCONTINUED | OUTPATIENT
Start: 2025-07-29 | End: 2025-07-31

## 2025-07-29 RX ORDER — PROPOFOL 10 MG/ML
INJECTION, EMULSION INTRAVENOUS
Status: DISCONTINUED | OUTPATIENT
Start: 2025-07-29 | End: 2025-07-29 | Stop reason: SDUPTHER

## 2025-07-29 RX ORDER — HYDRALAZINE HYDROCHLORIDE 20 MG/ML
10 INJECTION INTRAMUSCULAR; INTRAVENOUS
Status: CANCELLED | OUTPATIENT
Start: 2025-07-29

## 2025-07-29 RX ORDER — AMLODIPINE BESYLATE 5 MG/1
5 TABLET ORAL DAILY
Status: DISCONTINUED | OUTPATIENT
Start: 2025-07-29 | End: 2025-07-30

## 2025-07-29 RX ORDER — FENTANYL CITRATE 50 UG/ML
INJECTION, SOLUTION INTRAMUSCULAR; INTRAVENOUS
Status: DISCONTINUED | OUTPATIENT
Start: 2025-07-29 | End: 2025-07-29 | Stop reason: SDUPTHER

## 2025-07-29 RX ORDER — IPRATROPIUM BROMIDE AND ALBUTEROL SULFATE 2.5; .5 MG/3ML; MG/3ML
1 SOLUTION RESPIRATORY (INHALATION)
Status: CANCELLED | OUTPATIENT
Start: 2025-07-29

## 2025-07-29 RX ORDER — POLYETHYLENE GLYCOL 3350 17 G/17G
17 POWDER, FOR SOLUTION ORAL DAILY PRN
Status: DISCONTINUED | OUTPATIENT
Start: 2025-07-29 | End: 2025-07-31 | Stop reason: HOSPADM

## 2025-07-29 RX ORDER — SODIUM CHLORIDE 9 MG/ML
INJECTION, SOLUTION INTRAMUSCULAR; INTRAVENOUS; SUBCUTANEOUS
Status: DISCONTINUED
Start: 2025-07-29 | End: 2025-07-29 | Stop reason: WASHOUT

## 2025-07-29 RX ORDER — INSULIN LISPRO 100 [IU]/ML
0-4 INJECTION, SOLUTION INTRAVENOUS; SUBCUTANEOUS
Status: DISCONTINUED | OUTPATIENT
Start: 2025-07-29 | End: 2025-07-31 | Stop reason: HOSPADM

## 2025-07-29 RX ORDER — LIDOCAINE HYDROCHLORIDE 10 MG/ML
1 INJECTION, SOLUTION EPIDURAL; INFILTRATION; INTRACAUDAL; PERINEURAL
Status: CANCELLED | OUTPATIENT
Start: 2025-07-29

## 2025-07-29 RX ORDER — MEPERIDINE HYDROCHLORIDE 50 MG/ML
12.5 INJECTION INTRAMUSCULAR; INTRAVENOUS; SUBCUTANEOUS EVERY 5 MIN PRN
Refills: 0 | Status: CANCELLED | OUTPATIENT
Start: 2025-07-29

## 2025-07-29 RX ORDER — ACETAMINOPHEN 650 MG/1
650 SUPPOSITORY RECTAL EVERY 6 HOURS PRN
Status: DISCONTINUED | OUTPATIENT
Start: 2025-07-29 | End: 2025-07-31 | Stop reason: HOSPADM

## 2025-07-29 RX ORDER — OXYCODONE AND ACETAMINOPHEN 5; 325 MG/1; MG/1
2 TABLET ORAL
Refills: 0 | Status: CANCELLED | OUTPATIENT
Start: 2025-07-29

## 2025-07-29 RX ORDER — SODIUM CHLORIDE 9 MG/ML
INJECTION, SOLUTION INTRAVENOUS PRN
Status: DISCONTINUED | OUTPATIENT
Start: 2025-07-29 | End: 2025-07-31 | Stop reason: HOSPADM

## 2025-07-29 RX ORDER — CHLORTHALIDONE 25 MG/1
25 TABLET ORAL DAILY
Status: DISCONTINUED | OUTPATIENT
Start: 2025-07-29 | End: 2025-07-31 | Stop reason: HOSPADM

## 2025-07-29 RX ORDER — SODIUM CHLORIDE, SODIUM LACTATE, POTASSIUM CHLORIDE, CALCIUM CHLORIDE 600; 310; 30; 20 MG/100ML; MG/100ML; MG/100ML; MG/100ML
INJECTION, SOLUTION INTRAVENOUS CONTINUOUS
Status: CANCELLED | OUTPATIENT
Start: 2025-07-29

## 2025-07-29 RX ORDER — LIDOCAINE HYDROCHLORIDE 10 MG/ML
INJECTION, SOLUTION EPIDURAL; INFILTRATION; INTRACAUDAL; PERINEURAL
Status: DISCONTINUED | OUTPATIENT
Start: 2025-07-29 | End: 2025-07-29 | Stop reason: SDUPTHER

## 2025-07-29 RX ORDER — DIPHENHYDRAMINE HYDROCHLORIDE 50 MG/ML
12.5 INJECTION, SOLUTION INTRAMUSCULAR; INTRAVENOUS
Status: CANCELLED | OUTPATIENT
Start: 2025-07-29

## 2025-07-29 RX ORDER — ONDANSETRON 2 MG/ML
4 INJECTION INTRAMUSCULAR; INTRAVENOUS EVERY 6 HOURS PRN
Status: DISCONTINUED | OUTPATIENT
Start: 2025-07-29 | End: 2025-07-31 | Stop reason: HOSPADM

## 2025-07-29 RX ORDER — DEXAMETHASONE SODIUM PHOSPHATE 4 MG/ML
INJECTION, SOLUTION INTRA-ARTICULAR; INTRALESIONAL; INTRAMUSCULAR; INTRAVENOUS; SOFT TISSUE
Status: DISCONTINUED | OUTPATIENT
Start: 2025-07-29 | End: 2025-07-29 | Stop reason: SDUPTHER

## 2025-07-29 RX ORDER — CEFAZOLIN SODIUM 2 G/50ML
SOLUTION INTRAVENOUS
Status: DISCONTINUED | OUTPATIENT
Start: 2025-07-29 | End: 2025-07-29 | Stop reason: SDUPTHER

## 2025-07-29 RX ADMIN — KETOROLAC TROMETHAMINE 30 MG: 30 INJECTION, SOLUTION INTRAMUSCULAR at 16:57

## 2025-07-29 RX ADMIN — WATER 1000 MG: 1 INJECTION INTRAMUSCULAR; INTRAVENOUS; SUBCUTANEOUS at 21:51

## 2025-07-29 RX ADMIN — FENTANYL CITRATE 25 MCG: 50 INJECTION, SOLUTION INTRAMUSCULAR; INTRAVENOUS at 16:52

## 2025-07-29 RX ADMIN — LIDOCAINE HYDROCHLORIDE 50 MG: 10 INJECTION, SOLUTION EPIDURAL; INFILTRATION; INTRACAUDAL; PERINEURAL at 16:38

## 2025-07-29 RX ADMIN — DEXAMETHASONE SODIUM PHOSPHATE 8 MG: 4 INJECTION INTRA-ARTICULAR; INTRALESIONAL; INTRAMUSCULAR; INTRAVENOUS; SOFT TISSUE at 16:38

## 2025-07-29 RX ADMIN — FENTANYL CITRATE 75 MCG: 50 INJECTION, SOLUTION INTRAMUSCULAR; INTRAVENOUS at 17:03

## 2025-07-29 RX ADMIN — SODIUM CHLORIDE: 0.9 INJECTION, SOLUTION INTRAVENOUS at 19:20

## 2025-07-29 RX ADMIN — ONDANSETRON 4 MG: 2 INJECTION, SOLUTION INTRAMUSCULAR; INTRAVENOUS at 16:57

## 2025-07-29 RX ADMIN — SODIUM CHLORIDE, POTASSIUM CHLORIDE, SODIUM LACTATE AND CALCIUM CHLORIDE: 600; 310; 30; 20 INJECTION, SOLUTION INTRAVENOUS at 16:34

## 2025-07-29 RX ADMIN — SODIUM CHLORIDE: 0.9 INJECTION, SOLUTION INTRAVENOUS at 01:46

## 2025-07-29 RX ADMIN — SODIUM CHLORIDE: 0.9 INJECTION, SOLUTION INTRAVENOUS at 09:42

## 2025-07-29 RX ADMIN — INSULIN LISPRO 3 UNITS: 100 INJECTION, SOLUTION INTRAVENOUS; SUBCUTANEOUS at 21:50

## 2025-07-29 RX ADMIN — CEFAZOLIN SODIUM 2000 MG: 2 SOLUTION INTRAVENOUS at 16:34

## 2025-07-29 RX ADMIN — ATORVASTATIN CALCIUM 40 MG: 40 TABLET, FILM COATED ORAL at 21:49

## 2025-07-29 RX ADMIN — PROPOFOL 150 MG: 10 INJECTION, EMULSION INTRAVENOUS at 16:38

## 2025-07-29 RX ADMIN — SODIUM CHLORIDE, PRESERVATIVE FREE 10 ML: 5 INJECTION INTRAVENOUS at 21:52

## 2025-07-29 ASSESSMENT — PAIN - FUNCTIONAL ASSESSMENT: PAIN_FUNCTIONAL_ASSESSMENT: 0-10

## 2025-07-29 ASSESSMENT — ENCOUNTER SYMPTOMS
NAUSEA: 0
SHORTNESS OF BREATH: 0
EYES NEGATIVE: 1
VOMITING: 0
CONSTIPATION: 0
DIARRHEA: 0
ABDOMINAL PAIN: 0
COUGH: 0
ALLERGIC/IMMUNOLOGIC NEGATIVE: 1

## 2025-07-29 NOTE — ED PROVIDER NOTES
[facility] Emergency Department  [address]  [place] [address]  Phone: [phone]  Fax: [phone]      Attending Physician Attestation    I personally made/approves the management plan for this patient's and take responsibility for the patient management.    74-year-old male who was sent in today due to concern for elevated creatinine.  States he may have passed a kidney stone last week.  Creatinine less than previous at 3.6, sent in by nephrology.  Concern for UTI given nitrite positive, will cover with Rocephin.  CT added on due to possible obstructive uropathy, will need admission.    ED Course as of 07/28/25 2322 Mon Jul 28, 2025 2016 Patient CMP shows a BUN of 68, creatinine 3.6, GFR of 17 does not any profound leukocytosis however does have a urinalysis that is concerning for infection he has large hemoglobin, positive nitrates, large leukocytes, too numerous to count white blood cells and 20-50 red blood cells. Will give IV fluid bolus and initiate IV rocephin. [AH]   2037 X-ray of the abdomen is concerning for renal and pelvic calculi, will obtain CT abdomen pelvis without contrast to better ascertain obstructive uropathy. [AH]   2138 Bladder scan performed by nursing staff showed urinary retention of 550 mL.  Patient is unable to urinate, will place Hale catheter. []      ED Course User Index  [AH] Jabari Rowe, APRN - CNP       (Please note that portions of this note were completed with a voice recognition program.  Efforts were made to edit the dictations but occasionally words are mis-transcribed.)    Naomi Cam MD  Attending Emergency Medicine Physician      Naomi Cam MD  07/28/25 2322

## 2025-07-29 NOTE — ANESTHESIA POSTPROCEDURE EVALUATION
Department of Anesthesiology  Postprocedure Note    Patient: Adrian Gutierrez  MRN: [id number]  YOB: 1950  Date of evaluation: 7/29/2025    Procedure Summary       Date: 07/29/25 Room / Location: [address]    Anesthesia Start: 1634 Anesthesia Stop: 1708    Procedure: CYSTOSCOPY BILATERAL URETERAL STENT INSERTION (Bilateral: Ureter) Diagnosis:       Bilateral kidney stones      (Bilateral kidney stones [N20.0])    Surgeons: Henri Tate MD Responsible Provider: Luis Vyas MD    Anesthesia Type: general ASA Status: 2            Anesthesia Type: No value filed.    Silver Phase I: Silver Score: 10    Silver Phase II:      Anesthesia Post Evaluation    Patient location during evaluation: PACU  Patient participation: complete - patient participated  Level of consciousness: awake and alert  Airway patency: patent  Nausea & Vomiting: no nausea and no vomiting  Cardiovascular status: hemodynamically stable  Respiratory status: room air and spontaneous ventilation  Hydration status: euvolemic  Multimodal analgesia pain management approach  Pain management: adequate    No notable events documented.

## 2025-07-29 NOTE — H&P
[facility]  Office: [phone]  Donnie Garcia DO, Vicente Medellin, DO, Lorenzo Armstrong DO, Steven Goode DO, Aguila Gaston MD, Jessie Cohen MD, Ita Hopson MD, Hilda Richardson MD,  Jeremiah Martinez MD, Javier Horner MD, Pattie Young MD,  Kenneth Donald DO, Rafy Garcia DO, Romina Lundberg MD,  Andrés Hogue DO, Darleen Cunha MD, Terri Abbasi MD, Maddie De La Torre MD,  Quique Hubbard MD, Kellie Ballesteros MD, Mariola Orona MD, Navin Renteria MD, Merrill Moran MD, Francine Nolasco MD, Oh Virgen DO, Viry Guerrero MD, Bart Hollis DO, Ortega Lutz MD, Fili Choudhury MD, Kenneth Lundberg MD, Mohsin Reza, MD, Ava Schrader MD, Shirley Waterhouse, CNP,  Nury Brandon, CNP, Oh Vance, CNP,  Chel Gibson, DNP, Jessica Whitmore, CNP, Maddi Minaya, CNP, Drea Narayan, CNP, Evon Corbett, CNP, Alix Acevedo, PA-C, Jerri Meraz, CNP, Kenneth Streeter, CNP,  Cat Fried, CNP, Moira Snell, CNP, Saqib Romano, PA-C, Toshia Manzo PA-C, Phuong Miles, CNP,        Yani Astudillo, CNS, Lucero Ng, CNP, Shira Murrell, CNP         [facility]   IN-PATIENT SERVICE   [facility]    HISTORY AND PHYSICAL EXAMINATION            Date:   7/28/2025  Patient name:  Adrian Gutierrez  Date of admission:  7/28/2025  6:45 PM  MRN:   [id number]  Account:  [id number]  YOB: 1950  PCP:    Han Mathis MD  [facility]  Code Status:    Prior    Chief Complaint:     Chief Complaint   Patient presents with    abnornal labs     Wife reports pt had blood work done today that showed elevated Creatinine, called by dr to come to er today for scans and possible admission. Pt reports he may have passed a kidney stone last week.        History Obtained From:     patient, electronic medical record    History of Present Illness:     Adrian Gutierrez is a 74 y.o. Non- / non  male who presents with abnornal labs (Wife reports pt had blood work done today

## 2025-07-29 NOTE — OP NOTE
Dr. Henri Tate MD  Urologic Surgery      [place], [place]. [facility]  07/29/25    Patient:  Adrian Gutierrez  MRN: [id number]  YOB: 1950    Surgeon: Dr. Henri Tate MD  Assistant: None    Pre-op Diagnosis: Bilateral ureteral stones.  Right hydronephrosis.  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  Post-op Diagnosis: Same    Procedure:   Cystoscopy with right ureteroscopy, stone basketing/lithotripsy of right ureteral stone, Bilateral Ureteral Stent Placements.      Anesthesia: General  Complications: None  OR Blood Loss: Minimal  Fluids: Cystalloids  Implants: Bilateral 3Vb63my Double-J Ureteral Stent  Specimens: Right ureteral stone for chemical analysis.    Indication:  74-year-old male with bilateral ureteral stones and severe right hydronephrosis with parenchymal thinning from long-term obstruction.  Presents today for bilateral stent placement.  After risks and benefits were explained he elects to proceed.    Narrative of the Procedure:    After informed consent was obtained in the preoperative area, the patient was taken back to the operating room and transferred to the operating table in supine position.  EPC cuffs were placed. The machine was turned on.  Anesthesia was induced and antibiotics were given.  The patient was placed in modified dorsal lithotomy position and sterilely prepped and draped in a standard fashion.  A timeout occurred.  Two patient identifiers were used.  We entered the urethra with a 22 Hungarian scope. The Left ureteral orifice was visualized.  A guidewire was carefully advanced into the kidney and position was confirmed with xray. Under direct visualization the stent was advanced over the wire until it was in proper location. The Glidewire was then removed. A curl could be seen in the Left renal pelvis under using fluoroscopic vision, and in the bladder under direct visualization.  I then went to the right side.  There was a large distal

## 2025-07-29 NOTE — CONSULTS
Bebeto Varma, Siri, Lambert, Cedrick, Tara, & Ronal  Urology Consultation      Patient:  Adrian Gutierrez  MRN: [id number]  YOB: 1950    CHIEF COMPLAINT: Acute kidney injury.  Bilateral kidney stones.    HISTORY OF PRESENT ILLNESS:   The patient is a 74 y.o. male who presents with bilateral kidney stones.  Sent to the hospital due to creatinine of 4.0.  CT scan completed.  Images were independently reviewed and verified.  This demonstrates a distal right ureteral stone with severe right hydronephrosis and parenchymal thinning due to long-term obstruction as well as left nonobstructing distal ureteral calculi.  Patient has significant additional right-sided nonobstructing kidney stones.  Patient with minimal to no pain.  Timing at least 1 month for ureteral stones.  Likely more.  No worsening or exacerbating or relieving factors.  Patient's creatinine improved with Hale catheter and hydration.    Patient's old records, notes and chart reviewed and summarized above.    Past Medical History:    Past Medical History:   Diagnosis Date    Diabetes mellitus (HCC)     History of high cholesterol     Hypertension     Vitamin D deficiency        Past Surgical History:    No past surgical history on file.    Medications:      Current Facility-Administered Medications:     atorvastatin (LIPITOR) tablet 40 mg, 40 mg, Oral, Nightly, Moira Snell APRN - CNP    amLODIPine (NORVASC) tablet 5 mg, 5 mg, Oral, Daily, Moira Snell APRN - CNP    [Held by provider] metFORMIN (GLUCOPHAGE) tablet 1,000 mg, 1,000 mg, Oral, BID WC, Moria Snell APRN - CNP    [Held by provider] chlorthalidone (HYGROTON) tablet 25 mg, 25 mg, Oral, Daily, Moira Snell APRN - CNP    enoxaparin Sodium (LOVENOX) injection 30 mg, 30 mg, SubCUTAneous, Daily, Moira Snell APRN - CNP    ondansetron (ZOFRAN-ODT) disintegrating tablet 4 mg, 4 mg, Oral, Q8H PRN **OR** ondansetron (ZOFRAN) injection 4 mg, 4 mg, IntraVENous, Q6H PRN, Channing

## 2025-07-29 NOTE — CONSULTS
Renal Consult Note    Patient :  Adrian Gutierrez; 74 y.o. MRN# [id number]  Location:  ER09/ER09  Attending:  Viry Guerrero MD  Admit Date:  7/28/2025   Hospital Day: 1    Reason for Consult:     Asked by Viry Cameron MD to see for LIZ/Elevated Creatinine.    History Obtained From:     patient    History of Present Illness:     Adrian Gutierrez; 74 y.o. male with past medical history as mentioned below.  Known history of type 2 diabetes, hypertension, recurrent nephrolithiasis going on for about 40 years.  He is currently on metformin, amlodipine and chlorthalidone.  His baseline creatinine is not known, creatinine in 2023 was 0.8.  It is my understanding that sometime this year in March 2025 he was found to have elevated creatinine at 3.3 with a BUN of 62.  He was advised evaluation in the ER however patient failed to do so.    He was seen by Dr. Falcon yesterday in the office yesterday was found to have a creatinine of 4.0 and then advised evaluation in the ER.  Ultrasound of the kidneys showed moderate to severe right hydronephrosis with distal UVJ stone obstructing the right ureter.  Also found to have stones in the left ureter without any significant hydronephrosis of the left side.  He did have a high postvoid residual of 415 mL.  Kidneys showed some cortical thinning.  Hale catheter was then placed, IV fluids were started, creatinine came down to 3.3 this morning.  Urology has seen him and his plan for bilateral ureteral stent placement today.  Patient denies any shortness of breath, PND, orthopnea, nausea vomiting or diarrhea.  No fever chills or diaphoresis.  He has a good urinary stream overall.  He has not had any intervention for his nephrolithiasis in the past.  He did pass a stone a few days ago according to him.   Labs this morning show sodium 142 potassium 4.3 chloride 107 bicarb 25 BUN 61 creatinine 3.3 calcium 8.8 albumin 3.8 hemoglobin 8.9 white count 5.4 platelets 169.  Serological workup shows

## 2025-07-29 NOTE — ED NOTES
Pt provided a sandwich and juice  
on-call hospitalist for admission. [AH]   2315 Case was discussed mario Snell CNP agreeable for admission. [AH]      ED Course User Index  [] Jabari Rowe, APRN - CNP        Electronically signed by REMI WOODRUFF RN on 7/29/2025 at 7:51 AM

## 2025-07-29 NOTE — ANESTHESIA PRE PROCEDURE
Department of Anesthesiology  Preprocedure Note       Name:  Adrian Gutierrez   Age:  74 y.o.  :  1950                                          MRN:  [id number]         Date:  2025      Surgeon: Surgeon(s):  Henri Tate MD    Procedure: Procedure(s):  CYSTOSCOPY BILATERAL URETERAL STENT INSERTION    Medications prior to admission:   Prior to Admission medications    Medication Sig Start Date End Date Taking? Authorizing Provider   amLODIPine (NORVASC) 5 MG tablet Take 1 tablet by mouth daily 25  Yes Pramod Conway MD   chlorthalidone (HYGROTON) 25 MG tablet Take 1 tablet by mouth daily 6/10/25  Yes Pramod Conway MD   vitamin D 25 MCG (1000 UT) CAPS Take 1 capsule by mouth daily    Pramod Conway MD   atorvastatin (LIPITOR) 40 MG tablet  3/15/23   Pramod Conway MD   glucose monitoring (FREESTYLE FREEDOM) kit 1 kit by Does not apply route daily 3/10/23   Pattie Young MD   Lancets MISC 1 each by Does not apply route daily 3/10/23   Pattie Young MD   metFORMIN (GLUCOPHAGE) 1000 MG tablet Take 1 tablet by mouth 2 times daily (with meals) 3/17/23 7/28/25  Pattie Young MD   blood glucose monitor strips Test 4 times a day & as needed for symptoms of irregular blood glucose. Dispense sufficient amount for indicated testing frequency plus additional to accommodate PRN testing needs. 3/10/23   Pattie Young MD       Current medications:    Current Facility-Administered Medications   Medication Dose Route Frequency Provider Last Rate Last Admin   • atorvastatin (LIPITOR) tablet 40 mg  40 mg Oral Nightly Moira Snell APRN - CNP       • amLODIPine (NORVASC) tablet 5 mg  5 mg Oral Daily Moira Snell, APRN - CNP       • [Held by provider] metFORMIN (GLUCOPHAGE) tablet 1,000 mg  1,000 mg Oral BID WC Moira Snell, APRN - CNP       • [Held by provider] chlorthalidone (HYGROTON) tablet 25 mg  25 mg Oral Daily Moira Snell, APRN - CNP       • enoxaparin Sodium

## 2025-07-30 PROBLEM — Z86.39 HISTORY OF TYPE 2 DIABETES MELLITUS: Status: ACTIVE | Noted: 2025-07-30

## 2025-07-30 PROBLEM — N20.0 BILATERAL KIDNEY STONES: Status: ACTIVE | Noted: 2025-07-30

## 2025-07-30 LAB
ALBUMIN PERCENT: 58 % (ref 56–66)
ALBUMIN SERPL-MCNC: 4 G/DL (ref 3.2–5.2)
ALPHA 2 PERCENT: 12 % (ref 7–12)
ALPHA1 GLOB SERPL ELPH-MCNC: 0.4 G/DL (ref 0.1–0.4)
ALPHA1 GLOB SERPL ELPH-MCNC: 6 % (ref 3–5)
ALPHA2 GLOB SERPL ELPH-MCNC: 0.9 G/DL (ref 0.5–0.9)
ANA SER QL IA: NEGATIVE
ANCA MYELOPEROXIDASE: <0.3 AU/ML (ref 0–3.5)
ANCA PROTEINASE 3: <0.7 AU/ML (ref 0–2)
ANION GAP SERPL CALCULATED.3IONS-SCNC: 11 MMOL/L (ref 9–16)
B-GLOBULIN SERPL ELPH-MCNC: 0.7 G/DL (ref 0.7–1.4)
B-GLOBULIN SERPL ELPH-MCNC: 10 % (ref 8–13)
BUN SERPL-MCNC: 57 MG/DL (ref 8–23)
CALCIUM SERPL-MCNC: 9 MG/DL (ref 8.6–10.4)
CHLORIDE SERPL-SCNC: 106 MMOL/L (ref 98–107)
CO2 SERPL-SCNC: 23 MMOL/L (ref 20–31)
CREAT SERPL-MCNC: 3 MG/DL (ref 0.7–1.2)
DSDNA IGG SER QL IA: 0.7 IU/ML
EST. AVERAGE GLUCOSE BLD GHB EST-MCNC: 131 MG/DL
GAMMA GLOB SERPL ELPH-MCNC: 0.9 G/DL (ref 0.5–1.5)
GAMMA GLOBULIN %: 13 % (ref 11–19)
GFR, ESTIMATED: 21 ML/MIN/1.73M2
GLUCOSE BLD-MCNC: 133 MG/DL (ref 75–110)
GLUCOSE BLD-MCNC: 172 MG/DL (ref 75–110)
GLUCOSE BLD-MCNC: 190 MG/DL (ref 75–110)
GLUCOSE BLD-MCNC: 214 MG/DL (ref 75–110)
GLUCOSE SERPL-MCNC: 219 MG/DL (ref 74–99)
HBA1C MFR BLD: 6.2 % (ref 4–6)
ITYP INTERPRETATION: NORMAL
MICROORGANISM SPEC CULT: NORMAL
NUCLEAR IGG SER IA-RTO: 0.4 U/ML
OSMOLALITY UR: 511 MOSM/KG (ref 80–1300)
PATH REV: NORMAL
PATHOLOGIST: ABNORMAL
POTASSIUM SERPL-SCNC: 4.6 MMOL/L (ref 3.7–5.3)
PROT PATTERN SERPL ELPH-IMP: ABNORMAL
PROT SERPL-MCNC: 6.9 G/DL (ref 6.6–8.7)
SERVICE CMNT-IMP: NORMAL
SODIUM SERPL-SCNC: 140 MMOL/L (ref 136–145)
SPECIMEN DESCRIPTION: NORMAL
TOTAL PROT. SUM,%: 99 % (ref 98–102)
TOTAL PROT. SUM: 6.9 G/DL (ref 6.3–8.2)

## 2025-07-30 PROCEDURE — 82947 ASSAY GLUCOSE BLOOD QUANT: CPT

## 2025-07-30 PROCEDURE — 6370000000 HC RX 637 (ALT 250 FOR IP)

## 2025-07-30 PROCEDURE — 99232 SBSQ HOSP IP/OBS MODERATE 35: CPT | Performed by: INTERNAL MEDICINE

## 2025-07-30 PROCEDURE — 2500000003 HC RX 250 WO HCPCS

## 2025-07-30 PROCEDURE — 2580000003 HC RX 258

## 2025-07-30 PROCEDURE — 80048 BASIC METABOLIC PNL TOTAL CA: CPT

## 2025-07-30 PROCEDURE — 6360000002 HC RX W HCPCS

## 2025-07-30 PROCEDURE — 36415 COLL VENOUS BLD VENIPUNCTURE: CPT

## 2025-07-30 PROCEDURE — 83036 HEMOGLOBIN GLYCOSYLATED A1C: CPT

## 2025-07-30 PROCEDURE — 1200000000 HC SEMI PRIVATE

## 2025-07-30 RX ORDER — AMLODIPINE BESYLATE 5 MG/1
10 TABLET ORAL DAILY
Status: DISCONTINUED | OUTPATIENT
Start: 2025-07-31 | End: 2025-07-31 | Stop reason: HOSPADM

## 2025-07-30 RX ORDER — DEXTROSE MONOHYDRATE 100 MG/ML
INJECTION, SOLUTION INTRAVENOUS CONTINUOUS PRN
Status: DISCONTINUED | OUTPATIENT
Start: 2025-07-30 | End: 2025-07-31 | Stop reason: HOSPADM

## 2025-07-30 RX ORDER — GLUCAGON 1 MG/ML
1 KIT INJECTION PRN
Status: DISCONTINUED | OUTPATIENT
Start: 2025-07-30 | End: 2025-07-31 | Stop reason: HOSPADM

## 2025-07-30 RX ORDER — GLIPIZIDE 5 MG/1
10 TABLET ORAL
Status: DISCONTINUED | OUTPATIENT
Start: 2025-07-31 | End: 2025-07-31

## 2025-07-30 RX ADMIN — ATORVASTATIN CALCIUM 40 MG: 40 TABLET, FILM COATED ORAL at 22:35

## 2025-07-30 RX ADMIN — SODIUM CHLORIDE: 0.9 INJECTION, SOLUTION INTRAVENOUS at 02:49

## 2025-07-30 RX ADMIN — ENOXAPARIN SODIUM 30 MG: 100 INJECTION SUBCUTANEOUS at 09:38

## 2025-07-30 RX ADMIN — INSULIN LISPRO 1 UNITS: 100 INJECTION, SOLUTION INTRAVENOUS; SUBCUTANEOUS at 06:54

## 2025-07-30 RX ADMIN — POLYETHYLENE GLYCOL 3350 17 G: 17 POWDER, FOR SOLUTION ORAL at 22:35

## 2025-07-30 RX ADMIN — AMLODIPINE BESYLATE 5 MG: 5 TABLET ORAL at 09:37

## 2025-07-30 RX ADMIN — INSULIN LISPRO 1 UNITS: 100 INJECTION, SOLUTION INTRAVENOUS; SUBCUTANEOUS at 17:06

## 2025-07-30 RX ADMIN — SODIUM CHLORIDE, PRESERVATIVE FREE 10 ML: 5 INJECTION INTRAVENOUS at 21:00

## 2025-07-30 RX ADMIN — WATER 1000 MG: 1 INJECTION INTRAMUSCULAR; INTRAVENOUS; SUBCUTANEOUS at 23:00

## 2025-07-30 RX ADMIN — SODIUM CHLORIDE: 0.9 INJECTION, SOLUTION INTRAVENOUS at 11:00

## 2025-07-30 ASSESSMENT — PAIN SCALES - GENERAL: PAINLEVEL_OUTOF10: 0

## 2025-07-30 NOTE — CARE COORDINATION
housing: none  Potential Assistance needed at discharge: N/A            Potential DME:    Patient expects to discharge to: House  Plan for transportation at discharge: Family    Financial    Payor: [employer] MEDICARE / Plan: [employer] MEDICARE-ADVANTAGE PPO / Product Type: Medicare /     Does insurance require precert for SNF: Yes    Potential assistance Purchasing Medications: No  Meds-to-Beds request: Yes      [facility] - [place], [place] - [address] [facility] - P [phone] - F [phone]  [address] [facility] [address]  Phone: [phone] Fax: [phone]      Notes:    Factors facilitating achievement of predicted outcomes: Family support, Cooperative, and Pleasant    Barriers to discharge: Medical complications    Additional Case Management Notes: CM met with patient for discharge plan,.    Plan is home independent, with spouse to assist.  Has Delfina 3. Has transport    Denies any needs by CM /none identified    Hale cath, urology followig      The Plan for Transition of Care is related to the following treatment goals of Acute unilateral obstructive uropathy [N13.9]  Obstructive uropathy [N13.9]  Acute kidney injury [N17.9]  Acute cystitis with hematuria [N30.01]    IF APPLICABLE: The Patient and/or patient representative Adrian and his family were provided with a choice of provider and agrees with the discharge plan. Freedom of choice list with basic dialogue that supports the patient's individualized plan of care/goals and shares the quality data associated with the providers was provided to:     Patient Representative Name:       The Patient and/or Patient Representative Agree with the Discharge Plan?      Kristal Watson RN  Case Management Department  Ph:  Fax:

## 2025-07-31 VITALS
HEART RATE: 66 BPM | HEIGHT: 72 IN | TEMPERATURE: 97.7 F | BODY MASS INDEX: 26.55 KG/M2 | RESPIRATION RATE: 18 BRPM | OXYGEN SATURATION: 100 % | SYSTOLIC BLOOD PRESSURE: 182 MMHG | WEIGHT: 196 LBS | DIASTOLIC BLOOD PRESSURE: 71 MMHG

## 2025-07-31 LAB
ALBUMIN SERPL-MCNC: 3.8 G/DL (ref 3.5–5.2)
ALBUMIN/GLOB SERPL: 1.7 {RATIO} (ref 1–2.5)
ALP SERPL-CCNC: 77 U/L (ref 40–129)
ALT SERPL-CCNC: 5 U/L (ref 10–50)
ANION GAP SERPL CALCULATED.3IONS-SCNC: 11 MMOL/L (ref 9–16)
AST SERPL-CCNC: 14 U/L (ref 10–50)
BASOPHILS # BLD: 0.1 K/UL (ref 0–0.2)
BASOPHILS NFR BLD: 1 % (ref 0–2)
BILIRUB SERPL-MCNC: 0.4 MG/DL (ref 0–1.2)
BUN SERPL-MCNC: 48 MG/DL (ref 8–23)
CALCIUM SERPL-MCNC: 8.7 MG/DL (ref 8.6–10.4)
CHLORIDE SERPL-SCNC: 108 MMOL/L (ref 98–107)
CO2 SERPL-SCNC: 23 MMOL/L (ref 20–31)
CREAT SERPL-MCNC: 2.7 MG/DL (ref 0.7–1.2)
EOSINOPHIL # BLD: 0.2 K/UL (ref 0–0.4)
EOSINOPHILS RELATIVE PERCENT: 3 % (ref 1–4)
ERYTHROCYTE [DISTWIDTH] IN BLOOD BY AUTOMATED COUNT: 12.6 % (ref 12.5–15.4)
GFR, ESTIMATED: 24 ML/MIN/1.73M2
GLUCOSE BLD-MCNC: 104 MG/DL (ref 75–110)
GLUCOSE BLD-MCNC: 120 MG/DL (ref 75–110)
GLUCOSE SERPL-MCNC: 107 MG/DL (ref 74–99)
HCT VFR BLD AUTO: 27.9 % (ref 41–53)
HGB BLD-MCNC: 9.6 G/DL (ref 13.5–17.5)
LYMPHOCYTES NFR BLD: 1.4 K/UL (ref 1–4.8)
LYMPHOCYTES RELATIVE PERCENT: 21 % (ref 24–44)
MCH RBC QN AUTO: 31.2 PG (ref 26–34)
MCHC RBC AUTO-ENTMCNC: 34.3 G/DL (ref 31–37)
MCV RBC AUTO: 90.9 FL (ref 80–100)
MONOCYTES NFR BLD: 0.4 K/UL (ref 0.1–1.2)
MONOCYTES NFR BLD: 7 % (ref 2–11)
NEUTROPHILS NFR BLD: 68 % (ref 36–66)
NEUTS SEG NFR BLD: 4.6 K/UL (ref 1.8–7.7)
PLATELET # BLD AUTO: 179 K/UL (ref 140–450)
PMV BLD AUTO: 7.7 FL (ref 6–12)
POTASSIUM SERPL-SCNC: 4.6 MMOL/L (ref 3.7–5.3)
PROT SERPL-MCNC: 6.1 G/DL (ref 6.6–8.7)
RBC # BLD AUTO: 3.07 M/UL (ref 4.5–5.9)
SODIUM SERPL-SCNC: 142 MMOL/L (ref 136–145)
WBC OTHER # BLD: 6.7 K/UL (ref 3.5–11)

## 2025-07-31 PROCEDURE — 2580000003 HC RX 258

## 2025-07-31 PROCEDURE — 99239 HOSP IP/OBS DSCHRG MGMT >30: CPT | Performed by: INTERNAL MEDICINE

## 2025-07-31 PROCEDURE — 36415 COLL VENOUS BLD VENIPUNCTURE: CPT

## 2025-07-31 PROCEDURE — 82947 ASSAY GLUCOSE BLOOD QUANT: CPT

## 2025-07-31 PROCEDURE — 80053 COMPREHEN METABOLIC PANEL: CPT

## 2025-07-31 PROCEDURE — 99232 SBSQ HOSP IP/OBS MODERATE 35: CPT | Performed by: INTERNAL MEDICINE

## 2025-07-31 PROCEDURE — 85025 COMPLETE CBC W/AUTO DIFF WBC: CPT

## 2025-07-31 PROCEDURE — 6370000000 HC RX 637 (ALT 250 FOR IP): Performed by: INTERNAL MEDICINE

## 2025-07-31 RX ORDER — AMLODIPINE BESYLATE 10 MG/1
10 TABLET ORAL DAILY
Qty: 30 TABLET | Refills: 3 | Status: SHIPPED | OUTPATIENT
Start: 2025-08-01

## 2025-07-31 RX ORDER — HYDRALAZINE HYDROCHLORIDE 25 MG/1
25 TABLET, FILM COATED ORAL EVERY 8 HOURS SCHEDULED
Status: DISCONTINUED | OUTPATIENT
Start: 2025-07-31 | End: 2025-07-31 | Stop reason: HOSPADM

## 2025-07-31 RX ORDER — GLIPIZIDE 5 MG/1
5 TABLET ORAL
Qty: 60 TABLET | Refills: 3 | Status: SHIPPED | OUTPATIENT
Start: 2025-08-01

## 2025-07-31 RX ORDER — HYDRALAZINE HYDROCHLORIDE 20 MG/ML
10 INJECTION INTRAMUSCULAR; INTRAVENOUS ONCE
Status: DISCONTINUED | OUTPATIENT
Start: 2025-07-31 | End: 2025-07-31 | Stop reason: HOSPADM

## 2025-07-31 RX ORDER — GLIPIZIDE 5 MG/1
5 TABLET ORAL
Status: DISCONTINUED | OUTPATIENT
Start: 2025-08-01 | End: 2025-07-31 | Stop reason: HOSPADM

## 2025-07-31 RX ORDER — HYDRALAZINE HYDROCHLORIDE 25 MG/1
25 TABLET, FILM COATED ORAL EVERY 8 HOURS SCHEDULED
Qty: 90 TABLET | Refills: 3 | Status: SHIPPED | OUTPATIENT
Start: 2025-07-31

## 2025-07-31 RX ADMIN — GLIPIZIDE 10 MG: 5 TABLET ORAL at 07:08

## 2025-07-31 RX ADMIN — SODIUM CHLORIDE: 0.9 INJECTION, SOLUTION INTRAVENOUS at 02:56

## 2025-07-31 RX ADMIN — AMLODIPINE BESYLATE 10 MG: 5 TABLET ORAL at 10:29

## 2025-07-31 NOTE — PROGRESS NOTES
[facility]  Office: [phone]  Donnie Garcia DO, Vicente Medellin, DO, Lorenzo Armstrong DO, Steven Goode DO, Aguila Gaston MD, Jessie Cohen MD, Ita Hopson MD, Hilda Richardson MD,  Jeremiah Martinez MD, Javier Horner MD, Pattie Young MD,  Kenneth Donald DO, Judy Carreon MD, Ortega Lutz MD, Rafy Garcia DO, Romina Lundberg MD,  Andrés Hogue DO, Darleen Cunha MD, Terri Abbasi MD, Maddie De La Torre MD,  Quique Hubbard MD, Kellie Ballesteros MD, Mariola Orona MD, Navin Renteria MD, Merrill Moran MD, Francine Nolasco MD, Oh Virgen, DO, Viry Guerrero MD, Fili Choudhury MD, Kenneth Lundberg MD, Mohsin Reza, MD, Ava Schrader MD, Shirley Waterhouse, CNP,  Nury Brandon, CNP, Oh Vance, CNP,  Chel Gibson, DNP, Jessica Whitmore, CNP, Maddi Minaya, CNP, Drea Narayan, CNP, Evon Corbett, CNP, Alix Acevedo, PA-C, Jerri Meraz, CNP, Kenneth Streeter, CNP,  Cat Fried, CNP, Moira Snell, CNP, Saqib Romano, PA-C, Phuong Miles, CNP,  Yani Astudillo, CNS, Lucero Ng, CNP, Shira Murrell, CNP,   Elodia Hanson, CNP              [facility]   IN-PATIENT SERVICE   [facility]     7/29/2025    3:39 PM    Name:   Adrian Gutierrez  MRN:     [id number]     Acct:      [id number]   Room:   [facility]  IP Day:  1  Admit Date:  7/28/2025  6:45 PM    PCP:   Han Mathis MD  Code Status:  Full Code    Subjective:     C/C:   Chief Complaint   Patient presents with    abnornal labs     Wife reports pt had blood work done today that showed elevated Creatinine, called by dr to come to er today for scans and possible admission. Pt reports he may have passed a kidney stone last week.          Interval History Status:     Patient seen and examined in the emergency room, appears comfortable denies any pain currently.  Plan for OR later today    Patient vitals, labs and all providers notes were reviewed,from overnight shift and morning updates were noted and 
     [facility]  Office: [phone]  Donnie Garcia DO, Vicente Medellin, DO, Lorenzo Armstrong DO, Steven Goode DO, Aguila Gaston MD, Jessie Cohen MD, Ita Hopson MD, Hilda Richardson MD,  Jeremiah Martinez MD, Javier Horner MD, Pattie Young MD,  Kenneth Donald DO, Judy Carreon MD, Ortega Lutz MD, Rafy Garcia DO, Romina Lundberg MD,  Andrés Hogue DO, Darleen Cunha MD, Terri Abbasi MD, Maddie De La Torre MD,  uQique Hubbard MD, Kellie Ballesteros MD, Mariola Orona MD, Navin Renteria MD, Merrill Moran MD, Francine Nolasco MD, Oh Virgen DO, Viry Guerrero MD, Fili Choudhury MD, Kenneth Lundberg MD, Mohsin Reza, MD, Ava Schrader MD, Shirley Waterhouse, CNP,  Nury Brandon, CNP, Oh Vance, CNP,  Chel Gibson, DNP, Jessica Whitmore, CNP, Maddi Minaya, CNP, Drea Narayan, CNP, Evon Corbett, CNP, Alix Acevedo, PA-C, Jerri Meraz, CNP, Kenneth Streeter, CNP,  Cat Fried, CNP, Moira Snell, CNP, Saqib Romano, PA-C, Phuong Miles, CNP,  Yani Astudillo, CNS, Lucero Ng, CNP, Shira Murrell, CNP,   Elodia Hanson, CNP              [facility]   IN-PATIENT SERVICE   [facility]     7/30/2025    10:53 AM    Name:   Adrian Gutierrez  MRN:     [id number]     Acct:      [id number]   Room:   312/312-01   Day:  2  Admit Date:  7/28/2025  6:45 PM    PCP:   Han Mathis MD  Code Status:  Full Code    Subjective:     C/C:   Chief Complaint   Patient presents with    abnornal labs     Wife reports pt had blood work done today that showed elevated Creatinine, called by dr to come to er today for scans and possible admission. Pt reports he may have passed a kidney stone last week.          Interval History Status:     Patient seen and examined.  Underwent bilateral ureteric stents with lithotripsy yesterday.  Subjectively no new issues.  Creatinine still elevated but downtrending.  Urine cultures pending.    Patient vitals, labs and all providers notes were 
    Ronal Mercado Santacroce, Khan, Mostafa, Buck, Murtagh Jr  Urology Progress Note     Subjective: s/p BL ureteral stent placement.  Hale catheter in for maximum drainage.  Patient doing well cr improving.    Patient Vitals for the past 24 hrs:   BP Temp Temp src Pulse Resp SpO2   07/30/25 0935 (!) 157/74 97.3 °F (36.3 °C) Oral 60 17 99 %   07/29/25 2100 (!) 155/80 97.7 °F (36.5 °C) Oral 64 16 99 %   07/29/25 1816 (!) 148/76 97.5 °F (36.4 °C) -- 55 12 100 %   07/29/25 1755 (!) 154/68 -- -- 55 11 98 %   07/29/25 1747 -- -- -- 56 11 99 %   07/29/25 1745 (!) 140/64 -- -- 62 11 99 %   07/29/25 1730 (!) 158/66 -- -- 58 13 98 %   07/29/25 1720 -- -- -- 55 14 100 %   07/29/25 1715 (!) 144/67 -- -- 54 11 100 %   07/29/25 1705 128/62 97.3 °F (36.3 °C) Skin 53 10 100 %   07/29/25 1435 (!) 159/76 97.9 °F (36.6 °C) Infrared 66 17 98 %       Intake/Output Summary (Last 24 hours) at 7/30/2025 1338  Last data filed at 7/30/2025 0905  Gross per 24 hour   Intake 700 ml   Output 1425 ml   Net -725 ml       Recent Labs     07/28/25  1049 07/28/25  1940 07/29/25  0748   WBC 6.0 6.9 5.4   HGB 9.7* 10.3* 8.9*   HCT 28.7* 29.4* 25.5*   MCV 92.0 90.4 90.5    229 169     Recent Labs     07/28/25  1940 07/29/25  0748 07/30/25  0924    142 140   K 4.3 4.3 4.6    107 106   CO2 26 25 23   BUN 68* 61* 57*   CREATININE 3.6* 3.3* 3.0*       Recent Labs     07/28/25 1940   COLORU Yellow   PHUR 7.5   WBCUA TOO NUMEROUS TO COUNT   RBCUA 20 TO 50   BACTERIA MANY*   LEUKOCYTESUR LARGE*   UROBILINOGEN Normal   BILIRUBINUR NEGATIVE       Additional Lab/culture results:    Physical Exam:   Constitutional: Patient in no acute distress   Neuro: alert and oriented to person place and time.    Psych: Mood and affect normal.  Head: atraumatic normocephalic  Eyes: EOMi  HEENT: neck supple, trachea midline  Lungs: Respiratory effort normal  Cardiovascular:  Normal peripheral pulses  Abdomen: Soft, non-tender, non-distended, No CVA  Bladder: 
Nephrology Progress Note      SUBJECTIVE       Pt was seen and examined. No acute issues overnite. Stable hemodynamics .   Patient currently has a Hale indwelling.  Serum creatinine today is down to 3.0.  Admission creatinine was 4.0.  Patient did have bilateral ureteric stents placed Along with lithotripsy of the right ureteric stone.  Given thinning of the cortex on the right kidney there is a high level of suspicion that the right hydronephrosis has been chronic and there may have been significant damage to the right kidney from obstructive uropathy.  Patient does have a little hematuria as expected.  He denies any nausea vomiting diarrhea fevers or chills.  Reading through Dr. Velasco's note, he does have a serum creatinine of 3.3 in March as well    OBJECTIVE      CURRENT TEMPERATURE:  Temp: 97.3 °F (36.3 °C)  MAXIMUM TEMPERATURE OVER 24HRS:  Temp (24hrs), Av.5 °F (36.4 °C), Min:97.3 °F (36.3 °C), Max:97.9 °F (36.6 °C)    CURRENT RESPIRATORY RATE:  Respirations: 17  CURRENT PULSE:  Pulse: 60  CURRENT BLOOD PRESSURE:  BP: (!) 157/74  24HR BLOOD PRESSURE RANGE:  Systolic (24hrs), Av , Min:128 , Max:159   ; Diastolic (24hrs), Av, Min:62, Max:80    24HR INTAKE/OUTPUT:    Intake/Output Summary (Last 24 hours) at 2025 1208  Last data filed at 2025 0631  Gross per 24 hour   Intake 700 ml   Output 1000 ml   Net -300 ml     WEIGHT :Patient Vitals for the past 96 hrs (Last 3 readings):   Weight   25 1849 88.9 kg (196 lb)     PHYSICAL EXAM      GENERAL APPEARANCE:Awake, alert, in no acute distress  SKIN: warm and dry, no rash or erythema  EYES: conjunctivae normal and sclera anicteric  ENT: no thrush no pharyngeal congestion   NECK:   No JVD. No carotid bruits and no carotid lymphadenopathy .  PULMONARY: lungs are clear on auscultation. No Wheezing, no ronchi .   CADRDIOVASCULAR: S1 and S2 normal NO S3 and NO S4 . No rubs , no murmur.   ABDOMEN: soft nontender, bowel sounds present, no 
Please keep NPO.  Will schedule for cystoscopy with bilateral stents later today.
Value Date/Time    ALBCAL 4.0 07/28/2025 10:49 AM    ALBPCT 58 07/28/2025 10:49 AM    A1PCT 6 07/28/2025 10:49 AM    A2PCT 12 07/28/2025 10:49 AM    BETAPCT 10 07/28/2025 10:49 AM    GAMGLOB 0.9 07/28/2025 10:49 AM    GGPCT 13 07/28/2025 10:49 AM    PATH ELECTRONICALLY SIGNED. MABLE LEAL MD 07/28/2025 10:49 AM     MPO ANCA:   Lab Results   Component Value Date/Time    MPO <0.3 07/28/2025 10:49 AM    .  PR3 ANCA:    Lab Results   Component Value Date/Time    PR3 <0.7 07/28/2025 10:49 AM     URINALYSIS:  U/A:   Lab Results   Component Value Date/Time    NITRU POSITIVE 07/28/2025 07:40 PM    COLORU Yellow 07/28/2025 07:40 PM    PHUR 7.5 07/28/2025 07:40 PM    PHUR 5.5 03/16/2023 08:55 AM    WBCUA TOO NUMEROUS TO COUNT 07/28/2025 07:40 PM    RBCUA 20 TO 50 07/28/2025 07:40 PM    BACTERIA MANY 07/28/2025 07:40 PM    LEUKOCYTESUR LARGE 07/28/2025 07:40 PM    UROBILINOGEN Normal 07/28/2025 07:40 PM    BILIRUBINUR NEGATIVE 07/28/2025 07:40 PM    GLUCOSEU NEGATIVE 07/28/2025 07:40 PM    KETUA NEGATIVE 07/28/2025 07:40 PM           ASSESSMENT      1. LIZ likely secondary to obstructive uropathy.  Renal ultrasound results as above.  Had high postvoid residual of greater than 400 mL as well.  Creatinine was at 4, has come down to 2.7.  I suspect that he probably has underlying CKD stage IV to begin with.  2.  Patient status post cystoscopy and bilateral ureteric stent placement and lithotripsy of the right ureteric stone.:  3.  High likelihood that he has stage IV CKD secondary to chronic interstitial nephritis from chronic obstruction.  4.  History of diabetes mellitus type 2  5.  History of hypertension    PLAN      1.  Voiding trial per urology.   2.  Encourage oral fluids and discontinue IV fluids  3. Follow up labs ordered.   4.  Patient to follow-up with Dr. Akers in 2 to 4 weeks postdischarge.  Renal signing off please call us back if any questions arise      Please do not hesitate to call with questions.    This

## 2025-07-31 NOTE — DISCHARGE SUMMARY
[facility]  Office: [phone]  Donnie Garcia DO, Vicente Medellin DO, Lorenzo Armstrong DO, Steven Goode DO, Aguila Gaston MD, Jessie Cohen MD, Ita Hopson MD, Hilda Richardson MD,  Jeremiah Martinez MD, Javier Horner MD, Pattie Young MD,  Kenneth Donald DO, Judy Carreon MD, Ortega Lutz MD, Rafy Garcia DO, Romina Lundberg MD,  Andrés Hogue DO, Darleen Cunha MD, Terri Abbasi MD, Maddie De La Torre MD,  Quique Hubbard MD, Kellie Ballesteros MD, Mariola Orona MD, Navin Renteria MD, Merrill Moran MD, Francine Nolasco MD, Oh Virgen DO, Viry Guerrero MD, Fili Choudhury MD, Kenneth Lundberg MD, Mohsin Reza, MD, Ava Schrader MD, Shirley Waterhouse, CNP,  Nury Brandon, CNP, Oh Vance, CNP,  Chel Gibson, LAUREN, Jessica Whitmore, CNP, Maddi Minaya, CNP, Drea Narayan, CNP, Evon Corbett, CNP, Alix Acevedo, PA-C, Jerri Meraz, CNP, Kenneth Streeter, CNP,  Cat Fried, CNP, Moira Snell, CNP, Saqib Romano, PA-C, Phuong Miles, CNP,  Yani Astudillo, CNS, Lucero Ng, CNP, Shira Murrell, CNP,   Elodia Hanson, CNP           Discharge Summary     Patient ID: Adrian Gutierrez  :  1950   MRN: [id number]     ACCOUNT:  [id number]   Patient's PCP: Han Mathis MD  Admit Date: 2025   Discharge Date: 25   Length of Stay: 3  Code Status:  Full Code  Admitting Physician: Viry Guerrero MD  Discharge Physician: Viry Guerrero MD     Active Discharge Diagnoses:     Primary Problem  Acute unilateral obstructive uropathy      Hospital Problems  Active Hospital Problems    Diagnosis Date Noted    Newly diagnosed diabetes (HCC) [E11.9] 03/10/2023     Priority: Medium    Bilateral kidney stones [N20.0] 2025    History of type 2 diabetes mellitus [Z86.39] 2025    Acute unilateral obstructive uropathy [N13.9] 2025    Acute kidney injury [N17.9] 2025    Primary hypertension [I10] 2025    Acute cystitis with hematuria [N30.01] 2025  Complex Repair And Graft Additional Text (Will Appearing After The Standard Complex Repair Text): The complex repair was not sufficient to completely close the primary defect. The remaining additional defect was repaired with the graft mentioned below.

## 2025-07-31 NOTE — PLAN OF CARE
Problem: Pain  Goal: Verbalizes/displays adequate comfort level or baseline comfort level  7/31/2025 0238 by Keke White RN  Outcome: Progressing  7/30/2025 1818 by Samantha Shannon RN  Outcome: Progressing     Problem: Chronic Conditions and Co-morbidities  Goal: Patient's chronic conditions and co-morbidity symptoms are monitored and maintained or improved  7/31/2025 0238 by Keke White RN  Outcome: Progressing  Flowsheets (Taken 7/30/2025 2024)  Care Plan - Patient's Chronic Conditions and Co-Morbidity Symptoms are Monitored and Maintained or Improved:   Monitor and assess patient's chronic conditions and comorbid symptoms for stability, deterioration, or improvement   Collaborate with multidisciplinary team to address chronic and comorbid conditions and prevent exacerbation or deterioration  7/30/2025 1818 by Samantha Shannon RN  Outcome: Progressing     Problem: Discharge Planning  Goal: Discharge to home or other facility with appropriate resources  7/31/2025 0238 by Keke White RN  Outcome: Progressing  Flowsheets (Taken 7/30/2025 2024)  Discharge to home or other facility with appropriate resources:   Arrange for needed discharge resources and transportation as appropriate   Identify barriers to discharge with patient and caregiver   Identify discharge learning needs (meds, wound care, etc)  7/30/2025 1818 by Samantha Shannon RN  Outcome: Progressing     Problem: Safety - Adult  Goal: Free from fall injury  7/31/2025 0238 by Keke White RN  Outcome: Progressing  7/30/2025 1818 by Samantha Shannon RN  Outcome: Progressing     
  Problem: Pain  Goal: Verbalizes/displays adequate comfort level or baseline comfort level  Outcome: Progressing     Problem: Chronic Conditions and Co-morbidities  Goal: Patient's chronic conditions and co-morbidity symptoms are monitored and maintained or improved  Outcome: Progressing     Problem: Discharge Planning  Goal: Discharge to home or other facility with appropriate resources  Outcome: Progressing     Problem: Safety - Adult  Goal: Free from fall injury  Outcome: Progressing     
  Problem: Pain  Goal: Verbalizes/displays adequate comfort level or baseline comfort level  Outcome: Progressing     Problem: Chronic Conditions and Co-morbidities  Goal: Patient's chronic conditions and co-morbidity symptoms are monitored and maintained or improved  Outcome: Progressing  Flowsheets (Taken 7/29/2025 1949)  Care Plan - Patient's Chronic Conditions and Co-Morbidity Symptoms are Monitored and Maintained or Improved:   Monitor and assess patient's chronic conditions and comorbid symptoms for stability, deterioration, or improvement   Collaborate with multidisciplinary team to address chronic and comorbid conditions and prevent exacerbation or deterioration     Problem: Discharge Planning  Goal: Discharge to home or other facility with appropriate resources  Outcome: Progressing  Flowsheets (Taken 7/29/2025 1949)  Discharge to home or other facility with appropriate resources:   Identify barriers to discharge with patient and caregiver   Arrange for needed discharge resources and transportation as appropriate   Identify discharge learning needs (meds, wound care, etc)     Problem: Safety - Adult  Goal: Free from fall injury  Outcome: Progressing     
by Keke White, RN  Outcome: Progressing

## 2025-08-04 LAB
STONE COMPOSITION: NORMAL
STONE DESCRIPTION: NORMAL
STONE MASS: 46 MG

## (undated) DEVICE — SOLUTION IRRIG 1000ML 09% SOD CHL USP PIC PLAS CONTAINER

## (undated) DEVICE — 1LYRTR 16FR10ML100%SIL UMS SNP: Brand: MEDLINE INDUSTRIES, INC.

## (undated) DEVICE — GLOVE ORANGE PI 7 1/2   MSG9075

## (undated) DEVICE — GUIDEWIRE URO L150CM DIA .035IN STIFF NIT HYDRPHL STR TIP

## (undated) DEVICE — TUBING, SUCTION, 3/16" X 10', STRAIGHT: Brand: MEDLINE

## (undated) DEVICE — PROTECTOR ULN NRV PUR FOAM HK LOOP STRP ANATOMICALLY

## (undated) DEVICE — MHPB CYSTO PACK-LF: Brand: MEDLINE INDUSTRIES, INC.

## (undated) DEVICE — CONTAINER,SPECIMEN,4OZ,OR STRL: Brand: MEDLINE

## (undated) DEVICE — SOL IRR SOD CHL 0.9% TITAN XL CNTNR 3000ML

## (undated) DEVICE — SYSTEM FLD COLL W/ FLX DRP SUPP AND DISP BG